# Patient Record
Sex: FEMALE | Race: WHITE | NOT HISPANIC OR LATINO | Employment: FULL TIME | URBAN - NONMETROPOLITAN AREA
[De-identification: names, ages, dates, MRNs, and addresses within clinical notes are randomized per-mention and may not be internally consistent; named-entity substitution may affect disease eponyms.]

---

## 2017-05-08 ENCOUNTER — HISTORY (OUTPATIENT)
Dept: EMERGENCY MEDICINE | Facility: OTHER | Age: 47
End: 2017-05-08

## 2017-05-15 ENCOUNTER — OFFICE VISIT - GICH (OUTPATIENT)
Dept: CHIROPRACTIC MEDICINE | Facility: OTHER | Age: 47
End: 2017-05-15

## 2017-05-15 DIAGNOSIS — M99.02 SEGMENTAL AND SOMATIC DYSFUNCTION OF THORACIC REGION: ICD-10-CM

## 2017-05-15 DIAGNOSIS — M47.896 OTHER SPONDYLOSIS, LUMBAR REGION: ICD-10-CM

## 2017-05-15 DIAGNOSIS — M62.838 OTHER MUSCLE SPASM: ICD-10-CM

## 2017-05-15 DIAGNOSIS — M99.03 SEGMENTAL AND SOMATIC DYSFUNCTION OF LUMBAR REGION: ICD-10-CM

## 2017-05-15 DIAGNOSIS — M47.818 SPONDYLOSIS WITHOUT MYELOPATHY OR RADICULOPATHY, SACRAL AND SACROCOCCYGEAL REGION: ICD-10-CM

## 2017-05-15 DIAGNOSIS — M54.50 LOW BACK PAIN: ICD-10-CM

## 2017-05-15 DIAGNOSIS — M99.05 SEGMENTAL AND SOMATIC DYSFUNCTION OF PELVIC REGION: ICD-10-CM

## 2017-05-15 DIAGNOSIS — M54.6 PAIN IN THORACIC SPINE: ICD-10-CM

## 2017-05-18 ENCOUNTER — OFFICE VISIT - GICH (OUTPATIENT)
Dept: CHIROPRACTIC MEDICINE | Facility: OTHER | Age: 47
End: 2017-05-18

## 2017-05-18 DIAGNOSIS — M47.818 SPONDYLOSIS WITHOUT MYELOPATHY OR RADICULOPATHY, SACRAL AND SACROCOCCYGEAL REGION: ICD-10-CM

## 2017-05-18 DIAGNOSIS — M62.838 OTHER MUSCLE SPASM: ICD-10-CM

## 2017-05-18 DIAGNOSIS — M54.6 PAIN IN THORACIC SPINE: ICD-10-CM

## 2017-05-18 DIAGNOSIS — M99.03 SEGMENTAL AND SOMATIC DYSFUNCTION OF LUMBAR REGION: ICD-10-CM

## 2017-05-18 DIAGNOSIS — M54.50 LOW BACK PAIN: ICD-10-CM

## 2017-05-18 DIAGNOSIS — M99.05 SEGMENTAL AND SOMATIC DYSFUNCTION OF PELVIC REGION: ICD-10-CM

## 2017-05-18 DIAGNOSIS — M47.896 OTHER SPONDYLOSIS, LUMBAR REGION: ICD-10-CM

## 2017-05-18 DIAGNOSIS — M99.02 SEGMENTAL AND SOMATIC DYSFUNCTION OF THORACIC REGION: ICD-10-CM

## 2017-09-18 ENCOUNTER — HISTORY (OUTPATIENT)
Dept: CHIROPRACTIC MEDICINE | Facility: OTHER | Age: 47
End: 2017-09-18

## 2017-09-18 ENCOUNTER — COMMUNICATION - GICH (OUTPATIENT)
Dept: CHIROPRACTIC MEDICINE | Facility: OTHER | Age: 47
End: 2017-09-18

## 2017-09-18 ENCOUNTER — OFFICE VISIT - GICH (OUTPATIENT)
Dept: CHIROPRACTIC MEDICINE | Facility: OTHER | Age: 47
End: 2017-09-18

## 2017-09-18 DIAGNOSIS — M54.6 PAIN IN THORACIC SPINE: ICD-10-CM

## 2017-09-18 DIAGNOSIS — M62.838 OTHER MUSCLE SPASM: ICD-10-CM

## 2017-09-18 DIAGNOSIS — M99.03 SEGMENTAL AND SOMATIC DYSFUNCTION OF LUMBAR REGION: ICD-10-CM

## 2017-09-18 DIAGNOSIS — M99.02 SEGMENTAL AND SOMATIC DYSFUNCTION OF THORACIC REGION: ICD-10-CM

## 2017-09-18 DIAGNOSIS — M54.16 RADICULOPATHY OF LUMBAR REGION: ICD-10-CM

## 2017-09-18 DIAGNOSIS — M47.818 SPONDYLOSIS WITHOUT MYELOPATHY OR RADICULOPATHY, SACRAL AND SACROCOCCYGEAL REGION: ICD-10-CM

## 2017-09-18 DIAGNOSIS — M99.05 SEGMENTAL AND SOMATIC DYSFUNCTION OF PELVIC REGION: ICD-10-CM

## 2017-09-18 DIAGNOSIS — M47.896 OTHER SPONDYLOSIS, LUMBAR REGION: ICD-10-CM

## 2017-11-28 ENCOUNTER — TRANSFERRED RECORDS (OUTPATIENT)
Dept: HEALTH INFORMATION MANAGEMENT | Facility: OTHER | Age: 47
End: 2017-11-28

## 2017-12-27 NOTE — PROGRESS NOTES
Patient Information     Patient Name MRN Sex Aruna Zuleta [id number] Female 1970      Progress Notes by Marylou Samson DC at 2017  4:30 PM     Author:  Marylou Samson DC Service:  (none) Author Type:  [facility]- Other provider     Filed:  2017  2:35 PM Encounter Date:  2017 Status:  Signed     :  Marylou Samson DC ([facility]- Other provider)            PATIENT:  Aruna Johnson is a 47 y.o. female referred from Mg Renee in ED    PROBLEM:   Date of Initial Visit for this Episode:  ;  Last visits 5/15/2017 and 17  Chief Complaint     Patient presents with       Lower Back Pain      R low back pain and severe spasm x2days   9/18/17 Visit #1    SUBJECTIVE: Aruna presents with acute right low back pain and muscle spasms radiating down the back of her right leg and around to her anterior pelvis.   Locates superior to R sacroiliac.  Started 2 days ago without trauma.    This is similar to episodes she experiences in May that improved with a couple visits of chiropractic treatment.  Rates current pain 6 out of 10 and at worst 10 out of 10.     Aggravating factors: Standing, bearing weight on right leg, transitioning in bed.  Alleviating factors: low back flexion of right knee into chest, Ice, heat, muscle relaxers, prescription hydrocodone and over-the-counter pain medication.     Yesterday did try to get out of bed and couldn't stand because of severity of back pain and muscle spasms and experienced urinary incontinence.  No other episodes of bowel or bladder incontinence, denies saddle anesthesia or denies lower extremity weakness secondary to pain.     Hasn't consulted or been seen by medical provider as she is uncertain how care will be paid for.     She works part-time as a  sitting on a barstool height stool.    Review of Systems:  The patient denies any fevers, chills, nausea, vomiting, diarrhea, constipation, dysuria, hematuria, or urinary hesitancy or  "incontinence.  No shortness of breath, chest pain, or rashes.      OBJECTIVE:  /76  Pulse 96  Temp 99  F (37.2  C) (Temporal)   General - the patient is alert, oriented, and in distress with movement.  Here with daughter pushing her in a wheelchair, her appearance is unkept and casual dress.    Transferring from sitting in the wheelchair to standing to get onto the examination table she falls onto the floor due to pain weightbearing and pulls her knee into her chest while lying on her right side.  She is able to turn onto her back with head supported with a pillow and after a few minutes stretching her lower body she is able to transfer to seated, taking a break, then prone onto the table with both her daughters and my assistance.    Neurologic:  -SLR, both legs to 80 degrees, normal 2/4 reflexes with negative Babinskis bilaterally.  Resisted strength +4/5 lower extremity.  Light touch sensory intact.      Musculoskeletal:    -Valsalva  No spinous tenderness.  -Kemps  -Passive hip extension prone:  +Focal low back pain  +SI compression  +Leg length inequality: Mild +[facility]; Restricted L heel to buttock +\"tension low back\"  Passive external and internal hip rotation while prone ROM essentially normal.    +Tenderness: T4-5, L4, bilateral sacroiliacs  +Muscle spasm: moderate parascapular, thoracic, R QL, lumbar paraspinals, glutes.    No reproduction of R leg pain on palpation of myofascial trigger points.  +Joint asymmetry and restriction:  T5 L,  INES, RLI    ASSESSMENT: DDX Myofascial strain, Facet syndrome    ICD-10-CM    1. Acute right lumbar radiculopathy M54.16 NM CHIRO SPINAL MANIP 3-4 REGIONS GICH ONLY   2. Segmental and somatic dysfunction of lumbar region M99.03 NM CHIRO SPINAL MANIP 3-4 REGIONS GICH ONLY   3. Osteoarthritis of facet joint of lumbar spine M47.896 NM CHIRO SPINAL MANIP 3-4 REGIONS GICH ONLY   4. Segmental and somatic dysfunction of pelvic region M99.05 NM CHIRO SPINAL MANIP 3-4 " REGIONS GICH ONLY   5. Osteoarthritis of sacroiliac region M47.818 MS CHIRO SPINAL MANIP 3-4 REGIONS GICH ONLY   6. Acute midline thoracic back pain M54.6 MS CHIRO SPINAL MANIP 3-4 REGIONS GICH ONLY   7. Segmental and somatic dysfunction of thoracic region M99.02 MS CHIRO SPINAL MANIP 3-4 REGIONS GICH ONLY   8. Spasm of muscle M62.838 MS CHIRO SPINAL MANIP 3-4 REGIONS GICH ONLY       PLAN    Care:  Risks and benefits were explained and patient agreed to proceed with today's proposed care.   Spinal Adjustments to noted levels using Diversified prone thoracic, LE LAD and Drop pelvic technique.  Patient Response: improved pain, able to transfer back to sitting with less assistance.    INSTRUCTIONS   Sit in chair with hips and knees at 90 degrees and low back support.  Apply alternating ice and heat to area for 15-20 min. to reduce pain/muscle soreness, spasm, and swelling/inflammation.  Repeat every 2 hours as needed.   Gentle Range Of Motion stretching as shown Therapeutic Exercises:  spinal twist, knees to chest supine.  Return 1-3 days.  Discussed Red Flags when to seek immediate medical attention.    9/18/17  Plan of Care:   5-8 visits of Chiropractic Care including Spinal Adjustments and/or physiotherapy and active rehabilitation, to include exercises in the office and/or at home to meet care plan goals.     Frequency:  2-3xweek for up to 2 weeks trial and re-assess improvement.    POC discussed and patient agreeable to plan of care.      9/18/17 Goals:  To be met by Re-eval in 2-4 weeks.     Patient:     Patient will report improved pain.   Patient will report improved walking, standing, personal care.

## 2017-12-28 NOTE — PATIENT INSTRUCTIONS
Patient Information     Patient Name MRN Sex Aruna Zuleta [id number] Female 1970      Patient Instructions by Marylou Samson DC at 2017  4:30 PM     Author:  Marylou Samson DC Service:  (none) Author Type:  [facility]- Other provider     Filed:  2017  2:05 PM Encounter Date:  2017 Status:  Signed     :  Marylou Samson DC ([facility]- Other provider)              INSTRUCTIONS   Apply alternating ice and heat to area for 15-20 min. to reduce pain/muscle soreness, spasm, and swelling/inflammation.  Repeat every 2 hours as needed.   Gentle Range Of Motion stretching as shown Therapeutic Exercises:  spinal twist, knees to chest supine.  Return 1-3 days.  Discussed Red Flags when to seek immediate medical attention.    17  Plan of Care:   5-8 visits of Chiropractic Care including Spinal Adjustments and/or physiotherapy and active rehabilitation, to include exercises in the office and/or at home to meet care plan goals.     Frequency:  2-3xweek for up to 2 weeks trial and re-assess improvement.    POC discussed and patient agreeable to plan of care.      17 Goals:  To be met by Re-eval in 2-4 weeks.     Patient:     Patient will report improved pain.   Patient will report improved walking, standing, personal care.

## 2017-12-28 NOTE — TELEPHONE ENCOUNTER
Patient Information     Patient Name MRN Aruna Polo [id number] Female 1970      Telephone Encounter by Marylou Samson DC at 2017  9:08 AM     Author:  Marylou Samson DC Service:  (none) Author Type:  [facility]- Other provider     Filed:  2017  9:12 AM Encounter Date:  2017 Status:  Signed     :  Marylou Samson DC ([facility]- Other provider)            Pt. Scheduled for 4:30pm today to return for chiropractic care.  She had two visits in May that were helpful before having to discontinue when insurance changed and she was uncertain how she would afford care.   She is taking prescribed 10 mg muscle relaxors and pain medication and wondering if she can take more muscle relaxors before her visit.  She gets muscle cramping when standing up.    She has been using heat, alternating with ice as well.  Pt. Advised to discuss medication use with PCP.  Marylou Samson DC ....................  2017   9:12 AM

## 2017-12-30 NOTE — NURSING NOTE
Patient Information     Patient Name MRN Aruna Polo [id number] Female 1970      Nursing Note by Candelaria Cedeño at 2017  4:30 PM     Author:  Candelaria Cedeño  Service:  (none) Author Type:  (none)     Filed:  2017  2:12 PM  Encounter Date:  2017 Status:  Addendum     :  Candelaria Cedeño        Related Notes: Original Note by Candelaria Cedeño filed at 2017  1:56 PM            Documenting vitals that were taken on 17 but not noted. Candelaria Cedeño ....................  2017   2:12 PM

## 2018-01-05 NOTE — PATIENT INSTRUCTIONS
Patient Information     Patient Name MRN Sex Aruna Zuleta [id number] Female 1970      Patient Instructions by Marylou Samson DC at 5/15/2017  3:45 PM     Author:  Marylou Samson DC Service:  (none) Author Type:  [facility]- Other provider     Filed:  2017 12:09 PM Encounter Date:  5/15/2017 Status:  Signed     :  Marylou Samson DC ([facility]- Other provider)              INSTRUCTIONS   Apply ice to area for 15-20 min. to reduce pain/muscle soreness, spasm, and swelling/inflammation.  Repeat every 2 hours as needed.   Gentle Range Of Motion stretching as shown  This is a good time to practice other healthy choices to reduce physical, chemical and emotional stressors that reduce your body's ability to recover.    Remain hopeful.  Return 2-4 days.    5/15/2017  Plan of Care:  Short term 5-8 visits of Chiropractic Care including Spinal Adjustments and/or physiotherapy and active rehabilitation, to include exercises in the office and/or at home to meet care plan goals.     Frequency:  2xweek for up to 4 weeks. Re-eval progress.    POC discussed and patient agreeable to plan of care.      5/15/2017 Goals:  To be met by Re-eval in 1 month approx 6/15/2017 .     Patient:     Patient will report improved pain.   Patient will report improved walking, standing, personal care.   Patient will demonstrate an improved ability to complete Activities of Daily Living  as shown by a reported reduced 10% score on back index.   Objective:     Patient will demonstrate improved ROM and motion on palpation testing.

## 2018-01-05 NOTE — PROGRESS NOTES
"Patient Information     Patient Name MRN Sex Aruna Zuleta [id number] Female 1970      Progress Notes by Marylou Samson DC at 5/15/2017  3:45 PM     Author:  Marylou Samson DC Service:  (none) Author Type:  [facility]- Other provider     Filed:  2017 12:09 PM Encounter Date:  5/15/2017 Status:  Signed     :  Marylou Samson DC ([facility]- Other provider)            PATIENT:  Aruan Johnson is a 46 y.o. female referred from Mg Renee in ED    PROBLEM:   Date of Initial Visit for this Episode:  5/15/2017  Chief Complaint     Patient presents with       Back Pain      Low back, R leg      2017 Visit #1-8    SUBJECTIVE / HPI:   Description, Duration and Location of Primary Problem: Bilateral low back pain, located lumbosacral radiating toward anterior hips on both sides, right more than left. Has radiated down R outer leg to ankle. Sharp pain, muscle spasm, a \"whole body contraction\".    Mechanism of Injury: 17 after sitting at work.   While crossing the parking lot to be seen in ED her legs gave out on her and she fell landing on her buttocks.  She states the pain radiates to her front quads as well right more than the left.  Denies any loss of bowel or bladder control no anal numbness or tingling.     Duration and Frequency of Pain: Frequent  Radiation of pain: yes, R leg to ankle  Pain rated 6/10 at it's worst and 4/10 at it's best.  Pain course: gradually improving, slow.  Worse with: driving, pushing R leg on gas or brake.  Improved by: Rx meds flexeril and icey hot patches.  Hasn't used ibuprofen and hydrocodone.  Previous injury:  Chronic low back pain since having children, now 21 & 23  Years old. Dull, aching, stiff after in position for a length of time.    Gets sharp, spasm pains last time occurred was 5 years ago.    Works at [employer], sitting on higher stools with foot rest and standing.  Will carry heavy coin.     5/15/2017  Neck index Not assessed today.   " Back index 38%    Functional limitations:  Driving, walking, standing (extension) and personal care.      Other Health Care Providers seen for this: ED  Work Habits: sits on a stool as a   Exercise habits:    Sleeping habits:  Sleeping well  Hobbies/Interests:  Past D.C. Care: during pregnancy         There is no problem list on file for this patient.      No past medical history on file.    No past surgical history on file.      Current Outpatient Prescriptions:      cyclobenzaprine (FLEXERIL) 10 mg tablet, Take 1 tablet by mouth 3 times daily., Disp: 30 tablet, Rfl: 0     HYDROcodone-acetaminophen, 5-325 mg, (NORCO) per tablet, Take 1 tablet by mouth every 4 hours if needed  for Pain Max acetaminophen dose: 4000 mg in 24 hrs., Disp: 30 tablet, Rfl: 0     ibuprofen (ADVIL; MOTRIN) 600 mg tablet, Take 1 tablet by mouth 4 times daily if needed for Pain. Maximum of 3200 mg in 24 hours., Disp: 60 tablet, Rfl: 0     MULTIVIT-MINERALS/FERROUS FUM (MULTI VITAMIN ORAL), Take 1 Tab by mouth once daily., Disp: , Rfl:      PARoxetine (PAXIL) 30 mg tablet, , Disp: , Rfl:   Medications have been reviewed by me and are current to the best of my knowledge and ability.      Social History     Social History        Marital status:       Spouse name: N/A     Number of children:  N/A     Years of education:  N/A     Occupational History      Not on file.     Social History Main Topics          Smoking status:   Current Every Day Smoker      Types:  Cigarettes      Smokeless tobacco:   Never Used      Alcohol use   Yes      Comment: 2xs month       Drug use:   No      Sexual activity:   Not on file      Other Topics  Concern     Not on file      Social History Narrative     None noted   Allergies:  Review of patient's allergies indicates no known allergies.    ROS:  A comprehensive review of systems was negative except for items noted in HPI/Subjective.      OBJECTIVE:    DIAGNOSTICS:  IMAGING:       XR SPINE LUMBAR  3 VIEWS (Final result)  Result time: 05/08/17 17:14:51      Final result by Artaic (05/08/17 17:14:51)       Narrative:       PROCEDURE:  XR PELVIS 1 VIEW, XR SPINE LUMBAR 3 VIEWS    HISTORY: BACK PAIN, acute.    COMPARISON:  None.    TECHNIQUE:  AP pelvis, 3 views lumbosacral spine.    FINDINGS:  AP pelvis demonstrates an intact pelvic ring. No acute fracture or dislocation is seen. There are mild degenerative changes of the pubic symphysis and left greater than right SI joints. The hip joint spaces are fairly well preserved although a small synovial herniation pit is questioned along the upper outer margin of the right femoral head; correlate for any evidence of impingement.    Images of the lumbosacral spine demonstrate preserved lumbar vertebral body heights. There is mild leftward lumbar curvature. There is low lumbar facet degeneration, most pronounced on the left at L5-S1.     IMPRESSION: No acute fracture.  Degenerative changes as above.    Electronically Signed By: Misael Rascon on 5/8/2017 5:10 PM                       XR PELVIS 1 VIEW (Final result) Result time: 05/08/17 17:14:51      Final result by Artaic (05/08/17 17:14:51)       Narrative:       PROCEDURE:  XR PELVIS 1 VIEW, XR SPINE LUMBAR 3 VIEWS    HISTORY: BACK PAIN, acute.    COMPARISON:  None.    TECHNIQUE:  AP pelvis, 3 views lumbosacral spine.    FINDINGS:  AP pelvis demonstrates an intact pelvic ring. No acute fracture or dislocation is seen. There are mild degenerative changes of the pubic symphysis and left greater than right SI joints. The hip joint spaces are fairly well preserved although a small synovial herniation pit is questioned along the upper outer margin of the right femoral head; correlate for any evidence of impingement.    Images of the lumbosacral spine demonstrate preserved lumbar vertebral body heights. There is mild leftward lumbar curvature. There is low lumbar facet degeneration, most  "pronounced on the left at L5-S1.     IMPRESSION: No acute fracture.  Degenerative changes as above.    Electronically Signed By: Misael Clineilly on 5/8/2017 5:10 PM               PHYSICAL EXAM:   /73  Pulse (!) 103  Temp 100.1  F (37.8  C) (Temporal)  Resp 16  Ht 1.556 m (5' 1.25\")  Wt 54.8 kg (120 lb 12.8 oz)  LMP 05/04/2017  BMI 22.64 kg/m2    General Appearance: Pleasant, alert, appropriate appearance for age. No acute distress  Neurologic Exam: Nonfocal; symmetric DTRs, normal light touch sensory, normal gross motor movement, tone, and coordination. Downgoing toes.  No tremor.     Musculoskeletal Exam:   Posture: Mild kyphosis, Low L iliac crest.    Gait: unremarkable.     Cervical:  Not assessed today.     Thoracic/Lumbar/Pelvic:  ROM:  Flexion full, extension +R sacroiliac pain, RLF restricted, LLF +R sacroiliac pain and restricted.  +Gillets: +INES, RLI   - Straight leg raise  + AROLDO: +R sacroiliac jt pain, restricted ROM.   +Leg length inequality: [facility]; Restricted R heel to buttock     +Tenderness: T4-5, L3, bilateral sacroiliacs  +Muscle spasm: moderate parascapular, thoracic, lumbosacral paraspinals.   +Joint asymmetry and restriction:  T5 ext, L4 R, INES, RLI    ASSESSMENT: Aruna Johnson is a 46 y.o. female lower lumbar and sacroiliac degeneration.        ICD-10-CM    1. Acute bilateral low back pain, with sciatica presence unspecified M54.5 MA CHIRO SPINAL MANIP 3-4 REGIONS GICH ONLY   2. Segmental and somatic dysfunction of pelvic region M99.05 MA CHIRO SPINAL MANIP 3-4 REGIONS GICH ONLY   3. Osteoarthritis of sacroiliac region M47.818 MA CHIRO SPINAL MANIP 3-4 REGIONS GICH ONLY   4. Segmental and somatic dysfunction of lumbar region M99.03 MA CHIRO SPINAL MANIP 3-4 REGIONS GICH ONLY   5. Osteoarthritis of facet joint of lumbar spine M47.896 MA CHIRO SPINAL MANIP 3-4 REGIONS GICH ONLY   6. Acute midline thoracic back pain M54.6 MA CHIRO SPINAL MANIP 3-4 REGIONS GICH ONLY   7. Segmental " and somatic dysfunction of thoracic region M99.02 CO CHIRO SPINAL MANIP 3-4 REGIONS GICH ONLY   8. Spasm of muscle M62.838 CO CHIRO SPINAL MANIP 3-4 REGIONS GICH ONLY       PLAN    Care:  Risks and benefits were explained and patient agreed to proceed with today's proposed care.   Spinal Adjustments to noted levels using Diversified prone thoracic, side posture and Drop pelvic technique.  5 min. 38664 Therapeutic Exercises spent with patient one on one to develop strength and endurance, range of motion and flexibility.  Patient able to demonstrate: spinal twist, knees to chest supine.  atient Response: improved pain    INSTRUCTIONS   Apply ice to area for 15-20 min. to reduce pain/muscle soreness, spasm, and swelling/inflammation.  Repeat every 2 hours as needed.   Gentle Range Of Motion stretching as shown  This is a good time to practice other healthy choices to reduce physical, chemical and emotional stressors that reduce your body's ability to recover.    Remain hopeful.  Return 2-4 days.    5/15/2017  Plan of Care:  Short term 5-8 visits of Chiropractic Care including Spinal Adjustments and/or physiotherapy and active rehabilitation, to include exercises in the office and/or at home to meet care plan goals.     Frequency:  2xweek for up to 4 weeks. Re-eval progress.    POC discussed and patient agreeable to plan of care.      5/15/2017 Goals:  To be met by Re-eval in 1 month approx 6/15/2017 .     Patient:     Patient will report improved pain.   Patient will report improved walking, standing, personal care.   Patient will demonstrate an improved ability to complete Activities of Daily Living  as shown by a reported reduced 10% score on back index.   Objective:     Patient will demonstrate improved ROM and motion on palpation testing.

## 2018-01-05 NOTE — PATIENT INSTRUCTIONS
Patient Information     Patient Name MRN Sex Aruna Zuleta [id number] Female 1970      Patient Instructions by Marylou Samson DC at 2017 10:30 AM     Author:  Marylou Samson DC Service:  (none) Author Type:  [facility]- Other provider     Filed:  2017 12:14 PM Encounter Date:  2017 Status:  Signed     :  Marylou Samson DC ([facility]- Other provider)              INSTRUCTIONS   Apply ice to area for 15-20 min. to reduce pain/muscle soreness, spasm, and swelling/inflammation.  Repeat every 2 hours as needed.   Gentle Range Of Motion stretching as shown Therapeutic Exercises:  spinal twist, knees to chest supine.  Next visit flexion exercises to strengthen.  Return 3-4 days.    5/15/2017  Plan of Care:  Short term 5-8 visits of Chiropractic Care including Spinal Adjustments and/or physiotherapy and active rehabilitation, to include exercises in the office and/or at home to meet care plan goals.     Frequency:  2xweek for up to 4 weeks. Re-eval progress.    POC discussed and patient agreeable to plan of care.      5/15/2017 Goals:  To be met by Re-eval in 1 month approx 6/15/2017 .     Patient:     Patient will report improved pain.   Patient will report improved walking, standing, personal care.   Patient will demonstrate an improved ability to complete Activities of Daily Living  as shown by a reported reduced 10% score on back index.   Objective:     Patient will demonstrate improved ROM and motion on palpation testing.

## 2018-01-05 NOTE — PROGRESS NOTES
"Patient Information     Patient Name MRN Sex Aruna Zuleta [id number] Female 1970      Progress Notes by Marylou Samson DC at 2017 10:30 AM     Author:  Marylou Samson DC Service:  (none) Author Type:  [facility]- Other provider     Filed:  2017 12:15 PM Encounter Date:  2017 Status:  Signed     :  Marylou Samson DC ([facility]- Other provider)            PATIENT:  Aruna Johnson is a 46 y.o. female referred from [facility] in ED    PROBLEM:   Date of Initial Visit for this Episode:  5/15/2017  Chief Complaint     Patient presents with       Chiropractic Care      f/u    2017 Visit #2/5-8    SUBJECTIVE: Pain better, can move easier.  Rates it 2/10 current and at workst 4/10 after sitting 30 min.  Stretching helpful.    Hasn't taken any medication in past 3 days.      OBJECTIVE:   +Leg length inequality: Mild +Derefield R; Restricted L heel to buttock +\"tension low back\".  +Tenderness: T4-5, L3, bilateral sacroiliacs  +Muscle spasm: moderate parascapular, thoracic, lumbosacral paraspinals.   +Joint asymmetry and restriction:  T3 L, T5 R, L3 L, INES, RLI    ASSESSMENT:     ICD-10-CM    1. Acute bilateral low back pain, with sciatica presence unspecified M54.5 VT CHIRO SPINAL MANIP 3-4 REGIONS GICH ONLY   2. Segmental and somatic dysfunction of pelvic region M99.05 VT CHIRO SPINAL MANIP 3-4 [facility] ONLY   3. Osteoarthritis of sacroiliac region M47.818 VT CHIRO SPINAL MANIP 3-4 REGIONS GICH ONLY   4. Segmental and somatic dysfunction of lumbar region M99.03 VT CHIRO SPINAL MANIP 3-4 REGIONS GICH ONLY   5. Osteoarthritis of facet joint of lumbar spine M47.896 VT CHIRO SPINAL MANIP 3-4 REGIONS GICH ONLY   6. Acute midline thoracic back pain M54.6 VT CHIRO SPINAL MANIP 3-4 REGIONS GICH ONLY   7. Segmental and somatic dysfunction of thoracic region M99.02 VT CHIRO SPINAL MANIP 3-4 REGIONS GICH ONLY   8. Spasm of muscle M62.838 VT CHIRO SPINAL MANIP 3-4 REGIONS GICH ONLY "       PLAN    Care:  Risks and benefits were explained and patient agreed to proceed with today's proposed care.   Spinal Adjustments to noted levels using Diversified prone thoracic, LE LAD and Drop pelvic technique.    atient Response: improved pain    INSTRUCTIONS   Apply ice to area for 15-20 min. to reduce pain/muscle soreness, spasm, and swelling/inflammation.  Repeat every 2 hours as needed.   Gentle Range Of Motion stretching as shown Therapeutic Exercises:  spinal twist, knees to chest supine.  Next visit flexion exercises to strengthen.  Return 3-4 days.    5/15/2017  Plan of Care:  Short term 5-8 visits of Chiropractic Care including Spinal Adjustments and/or physiotherapy and active rehabilitation, to include exercises in the office and/or at home to meet care plan goals.     Frequency:  2xweek for up to 4 weeks. Re-eval progress.    POC discussed and patient agreeable to plan of care.      5/15/2017 Goals:  To be met by Re-eval in 1 month approx 6/15/2017 .     Patient:     Patient will report improved pain.   Patient will report improved walking, standing, personal care.   Patient will demonstrate an improved ability to complete Activities of Daily Living  as shown by a reported reduced 10% score on back index.   Objective:     Patient will demonstrate improved ROM and motion on palpation testing.

## 2018-01-10 ENCOUNTER — AMBULATORY - GICH (OUTPATIENT)
Dept: RADIOLOGY | Facility: OTHER | Age: 48
End: 2018-01-10

## 2018-01-10 DIAGNOSIS — Z12.31 ENCOUNTER FOR SCREENING MAMMOGRAM FOR MALIGNANT NEOPLASM OF BREAST: ICD-10-CM

## 2018-01-27 VITALS
DIASTOLIC BLOOD PRESSURE: 73 MMHG | HEIGHT: 61 IN | RESPIRATION RATE: 16 BRPM | SYSTOLIC BLOOD PRESSURE: 111 MMHG | BODY MASS INDEX: 22.81 KG/M2 | TEMPERATURE: 100.1 F | HEART RATE: 103 BPM | WEIGHT: 120.8 LBS

## 2018-01-27 VITALS — TEMPERATURE: 99 F | HEART RATE: 96 BPM | DIASTOLIC BLOOD PRESSURE: 76 MMHG | SYSTOLIC BLOOD PRESSURE: 116 MMHG

## 2018-02-14 ENCOUNTER — DOCUMENTATION ONLY (OUTPATIENT)
Dept: FAMILY MEDICINE | Facility: OTHER | Age: 48
End: 2018-02-14

## 2018-02-14 RX ORDER — HYDROCODONE BITARTRATE AND ACETAMINOPHEN 5; 325 MG/1; MG/1
1 TABLET ORAL EVERY 4 HOURS PRN
COMMUNITY
Start: 2017-05-08 | End: 2020-01-31

## 2018-02-14 RX ORDER — CYCLOBENZAPRINE HCL 10 MG
10 TABLET ORAL 3 TIMES DAILY
COMMUNITY
Start: 2017-05-08 | End: 2020-01-31

## 2018-02-14 RX ORDER — IBUPROFEN 600 MG/1
600 TABLET, FILM COATED ORAL 4 TIMES DAILY PRN
COMMUNITY
Start: 2017-05-08 | End: 2020-01-31

## 2018-02-14 RX ORDER — PAROXETINE 30 MG/1
TABLET, FILM COATED ORAL
COMMUNITY
Start: 2016-04-16 | End: 2020-01-31

## 2018-02-26 ENCOUNTER — TRANSFERRED RECORDS (OUTPATIENT)
Dept: MRI IMAGING | Facility: OTHER | Age: 48
End: 2018-02-26

## 2018-03-09 ENCOUNTER — HOSPITAL ENCOUNTER (OUTPATIENT)
Dept: MRI IMAGING | Facility: OTHER | Age: 48
Discharge: HOME | End: 2018-03-09
Payer: COMMERCIAL

## 2018-03-09 DIAGNOSIS — M79.661 RIGHT CALF PAIN: ICD-10-CM

## 2018-03-09 DIAGNOSIS — G89.29 CHRONIC RADICULAR PAIN OF LOWER EXTREMITY: ICD-10-CM

## 2018-03-09 DIAGNOSIS — M54.10 CHRONIC RADICULAR PAIN OF LOWER EXTREMITY: ICD-10-CM

## 2018-03-09 PROCEDURE — 72148 MRI LUMBAR SPINE W/O DYE: CPT

## 2018-08-24 ENCOUNTER — MEDICAL CORRESPONDENCE (OUTPATIENT)
Dept: HEALTH INFORMATION MANAGEMENT | Facility: OTHER | Age: 48
End: 2018-08-24

## 2018-09-19 DIAGNOSIS — M51.369 DDD (DEGENERATIVE DISC DISEASE), LUMBAR: Primary | ICD-10-CM

## 2018-10-24 ENCOUNTER — HOSPITAL ENCOUNTER (OUTPATIENT)
Dept: PHYSICAL THERAPY | Facility: OTHER | Age: 48
Setting detail: THERAPIES SERIES
End: 2018-10-24
Attending: PHYSICAL MEDICINE & REHABILITATION
Payer: COMMERCIAL

## 2018-10-24 DIAGNOSIS — M51.369 DDD (DEGENERATIVE DISC DISEASE), LUMBAR: ICD-10-CM

## 2018-10-24 PROCEDURE — 97161 PT EVAL LOW COMPLEX 20 MIN: CPT | Mod: GP | Performed by: PHYSICAL THERAPIST

## 2018-10-24 PROCEDURE — 97110 THERAPEUTIC EXERCISES: CPT | Mod: GP | Performed by: PHYSICAL THERAPIST

## 2018-10-24 PROCEDURE — 40000185 ZZHC STATISTIC PT OUTPT VISIT: Performed by: PHYSICAL THERAPIST

## 2018-10-24 NOTE — PROGRESS NOTES
10/24/18 1005   General Information   Type of Visit Initial OP Ortho PT Evaluation   Start of Care Date 10/24/18   Referring Physician Emmy   Patient/Family Goals Statement reduce back pain   Orders Evaluate and Treat   Date of Order 09/19/18   Insurance Type ([employer])   Medical Diagnosis DDD (degenerative disc disease), lumbar M51.36    Surgical/Medical history reviewed Yes   Precautions/Limitations no known precautions/limitations   Weight-Bearing Status - LLE full weight-bearing   Weight-Bearing Status - RLE full weight-bearing   General Information Comments refer to pt's chart for any additional information    Body Part(s)   Body Part(s) Lumbar Spine/SI   Presentation and Etiology   Pertinent history of current problem (include personal factors and/or comorbidities that impact the POC) Pt has had on/off back pain for the past year but she has noticed that her back has been more flared up as of recently. She's had some chiro sessions in the past. She does some stretching on her own. She had an injection 3-4 months ago and that helped, she isn't sure if the injection is wearing off and that is also contributing to her current back pain.   Impairments A. Pain   Functional Limitations perform activities of daily living;perform required work activities;perform desired leisure / sports activities   Symptom Location low back   How/Where did it occur While lifting   Onset date of current episode/exacerbation 10/24/17   Chronicity Chronic   Pain rating (0-10 point scale) Best (/10);Worst (/10)   Best (/10) 3   Worst (/10) 10  (hasn't had 10/10 pain in quite a while)   Pain quality A. Sharp;C. Aching   Frequency of pain/symptoms C. With activity   Pain/symptoms exacerbated by J. ADL;K. Home tasks;A. Sitting;B. Walking;G. Certain positions  (dishes, cleaning her house)   Pain/symptoms eased by H. Cold;I. OTC medication(s);C. Rest;E. Changing positions   Progression of symptoms since onset: Unchanged   Current /  Previous Interventions   Diagnostic Tests: MRI   MRI Results Results   MRI results see chart for details   Prior Level of Function   Prior Level of Function-Mobility Ind   Prior Level of Function-ADLs Ind   Current Level of Function   Current Community Support Family/friend caregiver   Patient role/employment history E. Unemployed   [facility]   Current equipment-Gait/Locomotion None   Current equipment-ADL None   Fall Risk Screen   Fall screen completed by PT   Have you fallen 2 or more times in the past year? No   Have you fallen and had an injury in the past year? No   Is patient a fall risk? No   Abuse Risk Screen   QUESTION TO PATIENT:  Has a member of your family or a partner(now or in the past) intimidated, hurt, manipulated, or controlled you in any way? no   QUESTION TO PATIENT: Do you feel safe going back to the place where you are living? yes   OBSERVATION: Is there reason to believe there has been maltreatment of a vulnerable adult (ie. Physical/Sexual/Emotional abuse, self neglect, lack of adequate food, shelter, medical care, or financial exploitation)? no   Functional Scales   Functional Scales Other   Other Scales  PSFS   Lumbar Spine/SI Objective Findings   Observation pt very pleasant and in no acute distress   Gait/Locomotion unremarkable   Repeated Extension-Standing ROM done in supine, did not decrease or increase pain   Pelvic Screen good body awareness, good technique with pelvic clocks   Hip Screen normal ROM; 4/5 MMT all planes bilaterally   Hamstring Flexibility normal but pt does report tightness with hamstring stretching   Neurological Testing Comments no discrepancies with LE dermatome or myotome testing   Planned Therapy Interventions   Planned Therapy Interventions joint mobilization;manual therapy;motor coordination training;neuromuscular re-education;strengthening   Planned Modality Interventions   Planned Modality Interventions Cryotherapy;Electrical  stimulation;Ultrasound   Clinical Impression   Criteria for Skilled Therapeutic Interventions Met yes, treatment indicated   PT Diagnosis LBP, SI pain   Influenced by the following impairments decreased LE strength   Functional limitations due to impairments ADLs, ie standing and washing dishes   Clinical Presentation Stable/Uncomplicated   Clinical Decision Making (Complexity) Low complexity   Therapy Frequency 2 times/Week   Predicted Duration of Therapy Intervention (days/wks) 8 weeks   Risk & Benefits of therapy have been explained Yes   Patient, Family & other staff in agreement with plan of care Yes   ORTHO GOALS   PT Ortho Eval Goals 1;2;3   Ortho Goal 1   Goal Identifier Pain   Goal Description Pt to report a max low back pain level of 1/10 for improved ADLs   Target Date 12/19/18   Ortho Goal 2   Goal Identifier Sleep   Goal Description Pt to report nights of improved sleep quality for improved wellness   Target Date 12/19/18   Ortho Goal 3   Goal Identifier ADLs   Goal Description Pt to be able to stand and do dishes without increasing LBP   Target Date 12/19/18   Total Evaluation Time   Total Evaluation Time 20

## 2018-11-01 ENCOUNTER — HOSPITAL ENCOUNTER (OUTPATIENT)
Dept: PHYSICAL THERAPY | Facility: OTHER | Age: 48
Setting detail: THERAPIES SERIES
End: 2018-11-01
Attending: PHYSICAL MEDICINE & REHABILITATION
Payer: COMMERCIAL

## 2018-11-01 PROCEDURE — 40000185 ZZHC STATISTIC PT OUTPT VISIT: Performed by: PHYSICAL THERAPIST

## 2018-11-01 PROCEDURE — 97110 THERAPEUTIC EXERCISES: CPT | Mod: GP | Performed by: PHYSICAL THERAPIST

## 2018-11-07 ENCOUNTER — HOSPITAL ENCOUNTER (OUTPATIENT)
Dept: PHYSICAL THERAPY | Facility: OTHER | Age: 48
Setting detail: THERAPIES SERIES
End: 2018-11-07
Attending: PHYSICAL MEDICINE & REHABILITATION
Payer: COMMERCIAL

## 2018-11-07 PROCEDURE — 97110 THERAPEUTIC EXERCISES: CPT | Mod: GP | Performed by: PHYSICAL THERAPIST

## 2018-11-07 PROCEDURE — 97035 APP MDLTY 1+ULTRASOUND EA 15: CPT | Mod: GP | Performed by: PHYSICAL THERAPIST

## 2018-11-07 PROCEDURE — 40000185 ZZHC STATISTIC PT OUTPT VISIT: Performed by: PHYSICAL THERAPIST

## 2018-11-07 PROCEDURE — 97140 MANUAL THERAPY 1/> REGIONS: CPT | Mod: GP | Performed by: PHYSICAL THERAPIST

## 2018-11-14 ENCOUNTER — HOSPITAL ENCOUNTER (OUTPATIENT)
Dept: PHYSICAL THERAPY | Facility: OTHER | Age: 48
Setting detail: THERAPIES SERIES
End: 2018-11-14
Attending: PHYSICAL MEDICINE & REHABILITATION
Payer: COMMERCIAL

## 2018-11-14 PROCEDURE — 97035 APP MDLTY 1+ULTRASOUND EA 15: CPT | Mod: GP | Performed by: PHYSICAL THERAPIST

## 2018-11-14 PROCEDURE — 97110 THERAPEUTIC EXERCISES: CPT | Mod: GP | Performed by: PHYSICAL THERAPIST

## 2018-11-14 PROCEDURE — 97140 MANUAL THERAPY 1/> REGIONS: CPT | Mod: GP | Performed by: PHYSICAL THERAPIST

## 2018-11-14 PROCEDURE — 40000185 ZZHC STATISTIC PT OUTPT VISIT: Performed by: PHYSICAL THERAPIST

## 2018-11-14 NOTE — ADDENDUM NOTE
Encounter addended by: Marty Deshpande, PT on: 11/14/2018  1:04 PM<BR>     Actions taken: Flowsheet accepted

## 2018-11-20 ENCOUNTER — HOSPITAL ENCOUNTER (OUTPATIENT)
Dept: PHYSICAL THERAPY | Facility: OTHER | Age: 48
Setting detail: THERAPIES SERIES
End: 2018-11-20
Attending: PHYSICAL MEDICINE & REHABILITATION
Payer: COMMERCIAL

## 2018-11-20 PROCEDURE — 97140 MANUAL THERAPY 1/> REGIONS: CPT | Mod: GP | Performed by: PHYSICAL THERAPIST

## 2018-11-20 PROCEDURE — 97110 THERAPEUTIC EXERCISES: CPT | Mod: GP | Performed by: PHYSICAL THERAPIST

## 2018-11-20 PROCEDURE — 97035 APP MDLTY 1+ULTRASOUND EA 15: CPT | Mod: GP | Performed by: PHYSICAL THERAPIST

## 2018-11-20 PROCEDURE — 40000185 ZZHC STATISTIC PT OUTPT VISIT: Performed by: PHYSICAL THERAPIST

## 2018-11-28 ENCOUNTER — HOSPITAL ENCOUNTER (OUTPATIENT)
Dept: PHYSICAL THERAPY | Facility: OTHER | Age: 48
Setting detail: THERAPIES SERIES
End: 2018-11-28
Attending: PHYSICAL MEDICINE & REHABILITATION
Payer: COMMERCIAL

## 2018-11-28 PROCEDURE — 40000185 ZZHC STATISTIC PT OUTPT VISIT: Performed by: PHYSICAL THERAPIST

## 2018-11-28 PROCEDURE — 97110 THERAPEUTIC EXERCISES: CPT | Mod: GP | Performed by: PHYSICAL THERAPIST

## 2018-12-05 ENCOUNTER — HOSPITAL ENCOUNTER (OUTPATIENT)
Dept: PHYSICAL THERAPY | Facility: OTHER | Age: 48
Setting detail: THERAPIES SERIES
End: 2018-12-05
Attending: PHYSICAL MEDICINE & REHABILITATION
Payer: COMMERCIAL

## 2018-12-05 PROCEDURE — 97110 THERAPEUTIC EXERCISES: CPT | Mod: GP | Performed by: PHYSICAL THERAPIST

## 2018-12-05 PROCEDURE — 40000185 ZZHC STATISTIC PT OUTPT VISIT: Performed by: PHYSICAL THERAPIST

## 2018-12-12 ENCOUNTER — HOSPITAL ENCOUNTER (OUTPATIENT)
Dept: PHYSICAL THERAPY | Facility: OTHER | Age: 48
Setting detail: THERAPIES SERIES
End: 2018-12-12
Attending: PHYSICAL MEDICINE & REHABILITATION
Payer: COMMERCIAL

## 2018-12-12 PROCEDURE — 97110 THERAPEUTIC EXERCISES: CPT | Mod: GP | Performed by: PHYSICAL THERAPIST

## 2019-01-25 NOTE — ADDENDUM NOTE
Encounter addended by: Marty Deshpande, PT on: 1/25/2019 1:10 PM   Actions taken: Sign clinical note, Episode resolved

## 2019-01-25 NOTE — PROGRESS NOTES
Outpatient Physical Therapy Discharge Note     Patient: Aruna Johnson  : 1970    Beginning/End Dates:  10/24/18 to 18    Referring Provider: Dr Booth    Therapy Diagnosis: DDD (degenerative disc disease), lumbar M51.36      Plan:  Discharge from therapy.    Discharge:   Pt has not returned to physical therapy after a trial of self mgmt techniques indicating that she is doing well with HEP. She will also utilize [facility] at [facility]. Please refer to pt's chart for most recent information on objective measurements, goals or any additional information. This is an unplanned discontinue.     Reason for Discharge: No further expectation of progress.  Patient chooses to discontinue therapy.    Discharge Plan: Patient to continue home program.

## 2019-07-26 ENCOUNTER — THERAPY VISIT (OUTPATIENT)
Dept: CHIROPRACTIC MEDICINE | Facility: OTHER | Age: 49
End: 2019-07-26
Attending: CHIROPRACTOR
Payer: COMMERCIAL

## 2019-07-26 DIAGNOSIS — M54.6 PAIN IN THORACIC SPINE: ICD-10-CM

## 2019-07-26 DIAGNOSIS — M99.02 SEGMENTAL AND SOMATIC DYSFUNCTION OF THORACIC REGION: ICD-10-CM

## 2019-07-26 DIAGNOSIS — M99.01 SEGMENTAL AND SOMATIC DYSFUNCTION OF CERVICAL REGION: Primary | ICD-10-CM

## 2019-07-26 DIAGNOSIS — M51.379 DEGENERATION OF LUMBAR OR LUMBOSACRAL INTERVERTEBRAL DISC: ICD-10-CM

## 2019-07-26 DIAGNOSIS — M99.04 SEGMENTAL AND SOMATIC DYSFUNCTION OF SACRAL REGION: ICD-10-CM

## 2019-07-26 DIAGNOSIS — M54.2 CERVICALGIA: ICD-10-CM

## 2019-07-26 PROCEDURE — 98941 CHIROPRACT MANJ 3-4 REGIONS: CPT | Mod: AT | Performed by: CHIROPRACTOR

## 2019-07-26 PROCEDURE — 99202 OFFICE O/P NEW SF 15 MIN: CPT | Mod: 25 | Performed by: CHIROPRACTOR

## 2019-07-26 NOTE — PROGRESS NOTES
PATIENT:  Aruna Johnson is a 49 year old female presenting for Neck pain    PROBLEM:   Date of Initial Visit for this Episode:  7/26/2019     Visit #1    SUBJECTIVE / HPI: Neck pain began approximately 2 days ago.  Patient attributes onset of neck pain to sleeping funny.  Patient denied any other traumatic events or other overuse injuries preceding flareup of neck pain symptoms.  As symptoms have not fully resolved patient presents to our office for further evaluation and treatment  Description and onset:  Duration and Frequency of Pain: 2 days and frequent  Radiation of pain: no  Pain rated at it's worst: 4/10  Pain rated currently:  4/10  Pain course: Gradually improving  Worse with:  twisting  Improved by:  No Treatment tried to date  Additional Features: unremarkable  Other Health Care Providers seen for this: Dr. Duran ELIZALDE  Previous treatment: Chiropractic  Previous injury:Unremarkable      Other Secondary/Associated Problems  Description, Duration and Location of Seondary Problem: Low back    Duration and Frequency of Pain: On going and constant  Radiation of pain: no  Pain rated at it's worst: 1/10  Pain rated currently:  1/10  Pain course: comes and goes  Worse with:  Nothing specific   Improved by:  adjustments  Additional Features: Patient does have history of degenerative changes  Other Health Care Providers seen for this: Dr. Garzon   Previous treatment: Chiropractic   Previous injury:Patient does have history of degenerative changes    See flowsheets in chart for details.  7/26/2019    Neck Disability Index (  Nirmal H. and Svitlana C. 1991. All rights reserved.; used with permission) 7/26/2019   SECTION 1 - PAIN INTENSITY 1   SECTION 2 - PERSONAL CARE 0   SECTION 3 - LIFTING 0   SECTION 4 - READING 1   SECTION 5 - HEADACHES 5   SECTION 6 - CONCENTRATION 0   SECTION 7 - WORK 0   SECTION 8 - DRIVING 2   SECTION 9 - SLEEPING 0   SECTION 10 - RECREATION 1   Count 10   Sum 10   Raw Score: /50 10   Neck  Disability Index Score: (%) 20     Functional limitations:  Headaches and twisting movements of the neck.      Past D.C. Care: yes, helpful       Health History as reported by the patient: Good      PAST MEDICAL HISTORY:  History reviewed. No pertinent past medical history.    PAST SURGICAL HISTORY:  No past surgical history on file.    ALLERGIES:  No Known Allergies    CURRENT MEDICATIONS:  Current Outpatient Medications   Medication Sig Dispense Refill     cyclobenzaprine (FLEXERIL) 10 MG tablet Take 10 mg by mouth 3 times daily       HYDROcodone-acetaminophen (NORCO) 5-325 MG per tablet Take 1 tablet by mouth every 4 hours as needed for pain Max acetaminophen dose: 4000mg in 24hrs.       ibuprofen (ADVIL/MOTRIN) 600 MG tablet Take 600 mg by mouth 4 times daily as needed for pain Max of 3200mg in 24 hours.       Multiple Vitamins-Minerals (MULTIVITAMIN & MINERAL PO) Take 1 tablet by mouth daily       PARoxetine (PAXIL) 30 MG tablet          SOCIAL HISTORY:  Marital Status: .  Children: Not on File  Occupation: Works for [employer].  Alcohol use:yes.  Tobacco use: Smoker: yes.      FAMILY HISTORY:  Family History   Problem Relation Age of Onset     Genetic Disorder Other         Genetic,None noted     Breast Cancer No family hx of         Cancer-breast       There is no problem list on file for this patient.        ROS:  The patient denies any fevers, chills, nausea, vomiting, diarrhea, constipation,dysuria, hematuria, or urinary hesitancy or incontinence.  No shortness of breath, chest pain, or rashes.    OBJECTIVE:    DIAGNOSTICS:Study Result     MR LUMBAR SPINE W/O CONTRAST     HISTORY: 47 years Female n; Right calf pain; Chronic radicular pain of  lower extremity; Chronic radicular pain of lower extremity     COMPARISONS:None     TECHNIQUE: Sagittal T1, sagittal T2, sagittal STIR, axial proton  density, axial T2 imaging of the lumbar spine was performed.     FINDINGS: Alignment of the lumbar spine is  normal   there is  discogenic bone marrow edema at the L4-L5 level. No bone marrow signal  abnormality is otherwise present.Signal intensity and caliber of the  lower thoracic spinal cord is normal. The conus medullaris is at the  T12-L1 level.     L5-S1: There is loss of disc signal and loss of disc height. The  central spinal canal is patent. The foramen are patent. There is  bilateral facet osteoarthritis.     L4-L5: There is loss of disc height and signal. There is a posterior  disc bulge with small to moderate-sized right paracentral disc  herniation. This narrows the right lateral recess and imparts mass  effect upon the right L5 nerve root. Both neuroforamen are patent.  There is bilateral facet osteoarthritis.     L3-L4: There is normal disc space height and signal. The central  spinal canal and neural foramen are patent.     L2-L3: There is normal disc space height and signal. The central  spinal canal and neural foramen are patent.     L1-L2: There is normal disc space height and signal. The central  spinal canal and neural foramen are patent.                                                                      IMPRESSION: There is a small to moderate size right paracentral disc  herniation at L4-L5. This narrows right lateral recess and imparts  mass effect upon the right L5 nerve root.     Mild degenerative disc disease at the L5-S1 level.     Bilateral facet osteoarthritis at L5-S1 and L4-L5.     RADHA CA MD   Study Result     PROCEDURE:  XR PELVIS 1 VIEW, XR SPINE LUMBAR 3 VIEWS     HISTORY: BACK PAIN, acute.     COMPARISON:  None.     TECHNIQUE:  AP pelvis, 3 views lumbosacral spine.     FINDINGS:  AP pelvis demonstrates an intact pelvic ring. No acute fracture or dislocation is seen. There are mild degenerative changes of the pubic symphysis and left greater than right SI joints. The hip joint spaces are fairly well preserved although a   small synovial herniation pit is questioned along the  upper outer margin of the right femoral head; correlate for any evidence of impingement.     Images of the lumbosacral spine demonstrate preserved lumbar vertebral body heights. There is mild leftward lumbar curvature. There is low lumbar facet degeneration, most pronounced on the left at L5-S1.     IMPRESSION:  No acute fracture.  Degenerative changes as above.     Electronically Signed By: Misael Rascon on 5/8/2017 5:10 PM     Study Result     PROCEDURE:  XR PELVIS 1 VIEW, XR SPINE LUMBAR 3 VIEWS     HISTORY: BACK PAIN, acute.     COMPARISON:  None.     TECHNIQUE:  AP pelvis, 3 views lumbosacral spine.     FINDINGS:  AP pelvis demonstrates an intact pelvic ring. No acute fracture or dislocation is seen. There are mild degenerative changes of the pubic symphysis and left greater than right SI joints. The hip joint spaces are fairly well preserved although a   small synovial herniation pit is questioned along the upper outer margin of the right femoral head; correlate for any evidence of impingement.     Images of the lumbosacral spine demonstrate preserved lumbar vertebral body heights. There is mild leftward lumbar curvature. There is low lumbar facet degeneration, most pronounced on the left at L5-S1.     IMPRESSION:  No acute fracture.  Degenerative changes as above.     Electronically Signed By: Misael Rascon on 5/8/2017 5:10 PM           PHYSICAL EXAM:     GENERAL APPEARANCE: healthy, alert, mild distress and cooperative   GAIT: NORMAL      MUSCULOSKELETAL:   Posture: Anterior head carriage, rounded shoulders L>>R.  Low left iliac crest, Level shoulders, Gait:  unremarkable.     Cervical  ROM:   smooth/halting arc of motion   50/50 flexion mild pain   40/45 extension    35/45 RLF  35/45 LLF    70/85 RR         75/85 LR       -Maximal Foraminal Compression: +focal mild neck pain.    Shoulder Depression: negative  -Distraction improves      Thoracic and Lumbar  ROM:  60/60 flexion 55/60 extension    35/45 RLF     40/45 LLF   45/45 RR      45/45 LR    -Kemps: negative  - Straight leg raise  Other:  +Elys on left, +Nachlas on left    +Tenderness: Along the suboccipital ridge bilaterally, C2 and C5 midline, left PSIS  +Muscle spasm: suboccipitals, trapezius, levator scapula bilaterally, left quadratus lumborum.  Taut and tender fibers are noted of the intrascapular T-spine paraspinals bilaterally.  +Joint asymmetry and restriction: C1 left lateral flexion right rotation, C2 extension right lateral flexion, T2 extension, TX extension, S1 extension and left lateral flexion.    ASSESSMENT: Aruna Johnson is a 49 year old female presented with primary complaints of neck pain with secondary complaints of low back pain.  Patient has been under chiropractic care in the past with beneficial results and I do anticipate that be the case this current episode of care.  No other contraindications are present precluding patient from receiving care on today's visit.  I do anticipate short course of interventional care at which time patient will likely placed on flareup care.  This is of course barring acute exacerbation of symptoms at this time.     1. Segmental and somatic dysfunction of cervical region    2. Cervicalgia    3. Segmental and somatic dysfunction of thoracic region    4. Pain in thoracic spine    5. Segmental and somatic dysfunction of sacral region    6. Degeneration of lumbar or lumbosacral intervertebral disc        PLAN    Evaluation and Management:  29101 Low to moderate level exam 20 min    Procedures:  Modalities:  None performed this visit    CMT:  [id number] Chiropractic manipulative treatment 3-4 regions performed   Cervical: Diversified, C1 , C2, Supine  Thoracic: Diversified, T2, T6, Prone  Pelvis: Diversified, Sacrum , Side posture    Therapeutic procedures:  None    Response to Treatment  Reduction in symptoms as reported by patient    Prognosis: Excellent    7/26/2019 Plan of Care: 2-4 visits of Chiropractic Care  including Spinal Adjustments and/or physiotherapy and active rehabilitation, to include exercises in the office and/or at home to meet care plan goals.     Frequency: 1xweek for up to 4 weeks. A reevaluation would be clinically appropriate in 4 visits, to determine progress and further course of care.    POC discussed and patient agreeable to plan of care.      7/26/2019 Goals:      Patient will report improved pain of the cervical spine by 25%.   Patient will report decreased headaches.   Patient will demonstrate an improved ability to complete Activities of Daily Living  as shown by a reported 10% reduced score on neck  index.    Patient will demonstrate improved ROM of the cervical spine.        INSTRUCTIONS   ice 20 minutes every other hour as needed and heat 15 minutes every other hour as needed    Follow-up:  Return to care in 1 week.        Disclaimer: This note consists of symbols derived from keyboarding, dictation and/or voice recognition software. As a result, there may be errors in the script that have gone undetected. Please consider this when interpreting information found in this chart.

## 2020-01-30 NOTE — PROGRESS NOTES
"Nursing Notes:   Codi Nicolas LPN  1/31/2020  9:51 AM  Sign at exiting of workspace  Chief Complaint   Patient presents with     Establish Care     Recheck Medication     Initial /70   Pulse 72   Temp 97.1  F (36.2  C) (Tympanic)   Resp 16   Ht 1.524 m (5')   Wt 56.7 kg (125 lb)   LMP 01/13/2020 (Approximate)   Breastfeeding No   BMI 24.41 kg/m    Estimated body mass index is 24.41 kg/m  as calculated from the following:    Height as of this encounter: 1.524 m (5').    Weight as of this encounter: 56.7 kg (125 lb).  Medication Reconciliation: complete  Codi Nicolas LPN      SUBJECTIVE:   Aruna Johnson is a 49 year old female who presents to clinic today for the following health issues:    HPI  Aruna is here to establish care.  She was previously seeing [facility].    She has a history of:  1. Lumbar back pain - DDD @ L5-S1, facet arthropathy L4-L5 and L5-S1 and small-mod disc herniation at L4-L5 with mass effect on R L5 nerve root  = as seen on Last MRI 3/9/2018.  Pain is in low back - worse with prolonged standing, bending/lifting and activities - like golfing.  She was worried it would be exacerbated when she started her new job at [employer] as a ; but she actually believes it is helping her back to stay active.  She has had epidural injection, PT, chiropractor, and medications in the past.  Previously following with pain management, PM&R.  Does not have any difficulty with her back at this time.  2. Refill of Paxil.  Has been on it for ~18 years; and was initially started for panic attacks/anxiety.  Sleep is okay.  Has tried to wean off of it at times; and mood worsens, but she also experiences the \"head zapping\" sensation that many get when trying to wean off this medication.  For the time being she is happy with her current dose.    PHQ-9 SCORE 1/31/2020   PHQ-9 Total Score 5     No flowsheet data found.      Pap: 4/17/2015, negative co-testing; but endometrial cells seen (normal " in pre-menopausal women).  DUE soon.  Still getting periods.  Every ~24-28 days.  Lasting 3 days (day #2 is the heaviest).    Mammogram: 4/6/2016.  DUE.  Will order today to have completed on same day as upcoming physical.  Colonoscopy: none.  Due @ 50 (this summer).  Dexa: none/NA.    Tdap: 4/17/2015, Flu: declines, Shingrix @ 50; PNA @ 65/66.    Patient Active Problem List    Diagnosis Date Noted     Lumbar back pain with radiculopathy affecting right lower extremity 03/02/2018     Priority: Medium     No past medical history on file.   Past Surgical History:   Procedure Laterality Date     EXTRACTION(S) DENTAL  ~2000    + nausea from anesthesia     Family History   Problem Relation Age of Onset     Breast Cancer Other      Cancer Mother         sarcoma - outside of stomach     No Known Problems Father      Breast Cancer Maternal Grandmother      Social History     Tobacco Use     Smoking status: Current Every Day Smoker     Types: Cigarettes     Smokeless tobacco: Never Used     Tobacco comment: 5 cigarettes    Substance Use Topics     Alcohol use: Yes     Comment: Alcoholic Drinks/day: couple drinks at night     Social History     Social History Narrative     Not on file     Current Outpatient Medications   Medication Sig Dispense Refill     PARoxetine (PAXIL) 30 MG tablet Take 1 tablet (30 mg) by mouth daily 90 tablet 4     Multiple Vitamins-Minerals (MULTIVITAMIN & MINERAL PO) Take 1 tablet by mouth daily       No Known Allergies    Review of Systems     OBJECTIVE:     /70   Pulse 72   Temp 97.1  F (36.2  C) (Tympanic)   Resp 16   Ht 1.524 m (5')   Wt 56.7 kg (125 lb)   LMP 01/13/2020 (Approximate)   Breastfeeding No   BMI 24.41 kg/m    Body mass index is 24.41 kg/m .  Physical Exam  Vitals signs and nursing note reviewed.   Constitutional:       Appearance: Normal appearance. She is normal weight.   HENT:      Head: Normocephalic and atraumatic.      Right Ear: Tympanic membrane and ear canal  normal.      Left Ear: Tympanic membrane and ear canal normal.      Nose: Nose normal.      Mouth/Throat:      Mouth: Mucous membranes are moist.   Eyes:      General:         Right eye: No discharge.         Left eye: No discharge.      Extraocular Movements: Extraocular movements intact.      Pupils: Pupils are equal, round, and reactive to light.   Neck:      Musculoskeletal: Normal range of motion and neck supple.      Vascular: No carotid bruit.   Cardiovascular:      Rate and Rhythm: Normal rate and regular rhythm.      Pulses: Normal pulses.      Heart sounds: Normal heart sounds.   Pulmonary:      Effort: Pulmonary effort is normal.      Breath sounds: Normal breath sounds.   Abdominal:      General: Abdomen is flat. There is no distension.   Lymphadenopathy:      Cervical: No cervical adenopathy.   Skin:     General: Skin is warm.      Capillary Refill: Capillary refill takes less than 2 seconds.      Findings: No erythema or rash.   Neurological:      General: No focal deficit present.      Mental Status: She is alert.      Motor: No weakness.      Gait: Gait normal.   Psychiatric:         Mood and Affect: Mood normal.         Judgment: Judgment normal.       Diagnostic Test Results:  Results for orders placed or performed in visit on 01/31/20   TSH     Status: None   Result Value Ref Range    Thyrotropin 2.11 0.34 - 5.60 IU/mL   Basic Metabolic Panel     Status: None   Result Value Ref Range    Sodium 138 134 - 144 mmol/L    Potassium 4.1 3.5 - 5.1 mmol/L    Chloride 103 98 - 107 mmol/L    Carbon Dioxide 28 21 - 31 mmol/L    Anion Gap 7 3 - 14 mmol/L    Glucose 91 70 - 105 mg/dL    Urea Nitrogen 14 7 - 25 mg/dL    Creatinine 0.83 0.60 - 1.20 mg/dL    GFR Estimate 73 >60 mL/min/[1.73_m2]    GFR Estimate If Black 88 >60 mL/min/[1.73_m2]    Calcium 9.4 8.6 - 10.3 mg/dL   Lipid Panel     Status: Abnormal   Result Value Ref Range    Cholesterol 224 (H) <200 mg/dL    Triglycerides 73 <150 mg/dL    HDL  Cholesterol 55 23 - 92 mg/dL    LDL Cholesterol Calculated 154 (H) <100 mg/dL    Non HDL Cholesterol 169 (H) <130 mg/dL       ASSESSMENT/PLAN:     1. Encounter to establish care with new doctor    2. Lumbar back pain with radiculopathy affecting right lower extremity  No concerns today; encouraged to continue with regular physical activity and stretching daily.    3. Dysthymia  Chronic, stable.    4. MARY (generalized anxiety disorder)  Chronic, stable on current Paxil 30mg daily.  If worsening, can consider making use of employee benefits and seeing a counselor/therapist as a trial.  - PARoxetine (PAXIL) 30 MG tablet; Take 1 tablet (30 mg) by mouth daily  Dispense: 90 tablet; Refill: 4    5. Visit for screening mammogram  Ordered to be completed with an upcoming physical.  - MA Screen Bilateral w/Raman; Future    6. Screening for thyroid disorder  - TSH; Future  - TSH    7. Screening for diabetes mellitus  - Basic Metabolic Panel; Future  - Basic Metabolic Panel    8. Lipid screening  - Lipid Panel; Future  - Lipid Panel    Fasting today; so labs will be checked.  Has had some elevated cholesterol in the past  Discussed new recommendation for heart disease prevention.    Tegan Koch DO  [facility]    ADDENDUM:  Cholesterol returned elevated, especially  after 1 year of lifestyle changes, improved eating habits.  She was recommended to start a statin and it will be sent to the pharmacy.  The 10-year ASCVD risk score (Adeel RODRIGUEZ Jr., et al., 2013) is: 3%    Values used to calculate the score:      Age: 49 years      Sex: Female      Is Non- : No      Diabetic: No      Tobacco smoker: Yes      Systolic Blood Pressure: 112 mmHg      Is BP treated: No      HDL Cholesterol: 55 mg/dL      Total Cholesterol: 224 mg/dL    Tegan Koch DO on 2/2/2020 at 5:03 AM

## 2020-01-31 ENCOUNTER — OFFICE VISIT (OUTPATIENT)
Dept: FAMILY MEDICINE | Facility: OTHER | Age: 50
End: 2020-01-31
Attending: FAMILY MEDICINE
Payer: COMMERCIAL

## 2020-01-31 VITALS
WEIGHT: 125 LBS | TEMPERATURE: 97.1 F | RESPIRATION RATE: 16 BRPM | HEIGHT: 60 IN | DIASTOLIC BLOOD PRESSURE: 70 MMHG | SYSTOLIC BLOOD PRESSURE: 112 MMHG | BODY MASS INDEX: 24.54 KG/M2 | HEART RATE: 72 BPM

## 2020-01-31 DIAGNOSIS — Z76.89 ENCOUNTER TO ESTABLISH CARE WITH NEW DOCTOR: Primary | ICD-10-CM

## 2020-01-31 DIAGNOSIS — Z13.29 SCREENING FOR THYROID DISORDER: ICD-10-CM

## 2020-01-31 DIAGNOSIS — M54.16 LUMBAR BACK PAIN WITH RADICULOPATHY AFFECTING RIGHT LOWER EXTREMITY: ICD-10-CM

## 2020-01-31 DIAGNOSIS — E78.00 ELEVATED LDL CHOLESTEROL LEVEL: ICD-10-CM

## 2020-01-31 DIAGNOSIS — Z12.31 VISIT FOR SCREENING MAMMOGRAM: ICD-10-CM

## 2020-01-31 DIAGNOSIS — Z13.220 LIPID SCREENING: ICD-10-CM

## 2020-01-31 DIAGNOSIS — F34.1 DYSTHYMIA: ICD-10-CM

## 2020-01-31 DIAGNOSIS — F41.1 GAD (GENERALIZED ANXIETY DISORDER): ICD-10-CM

## 2020-01-31 DIAGNOSIS — Z13.1 SCREENING FOR DIABETES MELLITUS: ICD-10-CM

## 2020-01-31 LAB
ANION GAP SERPL CALCULATED.3IONS-SCNC: 7 MMOL/L (ref 3–14)
BUN SERPL-MCNC: 14 MG/DL (ref 7–25)
CALCIUM SERPL-MCNC: 9.4 MG/DL (ref 8.6–10.3)
CHLORIDE SERPL-SCNC: 103 MMOL/L (ref 98–107)
CHOLEST SERPL-MCNC: 224 MG/DL
CO2 SERPL-SCNC: 28 MMOL/L (ref 21–31)
CREAT SERPL-MCNC: 0.83 MG/DL (ref 0.6–1.2)
GFR SERPL CREATININE-BSD FRML MDRD: 73 ML/MIN/{1.73_M2}
GLUCOSE SERPL-MCNC: 91 MG/DL (ref 70–105)
HDLC SERPL-MCNC: 55 MG/DL (ref 23–92)
LDLC SERPL CALC-MCNC: 154 MG/DL
NONHDLC SERPL-MCNC: 169 MG/DL
POTASSIUM SERPL-SCNC: 4.1 MMOL/L (ref 3.5–5.1)
SODIUM SERPL-SCNC: 138 MMOL/L (ref 134–144)
TRIGL SERPL-MCNC: 73 MG/DL
TSH SERPL DL<=0.05 MIU/L-ACNC: 2.11 IU/ML (ref 0.34–5.6)

## 2020-01-31 PROCEDURE — 80061 LIPID PANEL: CPT | Mod: ZL | Performed by: FAMILY MEDICINE

## 2020-01-31 PROCEDURE — 80048 BASIC METABOLIC PNL TOTAL CA: CPT | Mod: ZL | Performed by: FAMILY MEDICINE

## 2020-01-31 PROCEDURE — 99214 OFFICE O/P EST MOD 30 MIN: CPT | Performed by: FAMILY MEDICINE

## 2020-01-31 PROCEDURE — 36415 COLL VENOUS BLD VENIPUNCTURE: CPT | Mod: ZL | Performed by: FAMILY MEDICINE

## 2020-01-31 PROCEDURE — 84443 ASSAY THYROID STIM HORMONE: CPT | Mod: ZL | Performed by: FAMILY MEDICINE

## 2020-01-31 RX ORDER — PAROXETINE 30 MG/1
30 TABLET, FILM COATED ORAL DAILY
Qty: 90 TABLET | Refills: 4 | Status: SHIPPED | OUTPATIENT
Start: 2020-01-31 | End: 2021-02-16

## 2020-01-31 ASSESSMENT — PAIN SCALES - GENERAL: PAINLEVEL: NO PAIN (0)

## 2020-01-31 ASSESSMENT — MIFFLIN-ST. JEOR: SCORE: 1113.5

## 2020-01-31 ASSESSMENT — PATIENT HEALTH QUESTIONNAIRE - PHQ9: SUM OF ALL RESPONSES TO PHQ QUESTIONS 1-9: 5

## 2020-01-31 NOTE — NURSING NOTE
Chief Complaint   Patient presents with     [facility] Care     Recheck Medication       Initial /70   Pulse 72   Temp 97.1  F (36.2  C) (Tympanic)   Resp 16   Ht 1.524 m (5')   Wt 56.7 kg (125 lb)   LMP 01/13/2020 (Approximate)   Breastfeeding No   BMI 24.41 kg/m   Estimated body mass index is 24.41 kg/m  as calculated from the following:    Height as of this encounter: 1.524 m (5').    Weight as of this encounter: 56.7 kg (125 lb).  Medication Reconciliation: complete    Codi Nicolas LPN

## 2020-02-02 PROBLEM — F41.1 GAD (GENERALIZED ANXIETY DISORDER): Status: ACTIVE | Noted: 2020-02-02

## 2020-02-02 PROBLEM — E78.00 ELEVATED LDL CHOLESTEROL LEVEL: Status: ACTIVE | Noted: 2020-02-02

## 2020-02-02 RX ORDER — ATORVASTATIN CALCIUM 10 MG/1
10 TABLET, FILM COATED ORAL DAILY
Qty: 90 TABLET | Refills: 1 | Status: SHIPPED | OUTPATIENT
Start: 2020-02-02 | End: 2020-10-22 | Stop reason: SINTOL

## 2020-03-09 ENCOUNTER — THERAPY VISIT (OUTPATIENT)
Dept: CHIROPRACTIC MEDICINE | Facility: OTHER | Age: 50
End: 2020-03-09
Attending: CHIROPRACTOR
Payer: COMMERCIAL

## 2020-03-09 DIAGNOSIS — M54.50 ACUTE RIGHT-SIDED LOW BACK PAIN WITHOUT SCIATICA: ICD-10-CM

## 2020-03-09 DIAGNOSIS — M51.379 DEGENERATION OF LUMBAR OR LUMBOSACRAL INTERVERTEBRAL DISC: Primary | ICD-10-CM

## 2020-03-09 DIAGNOSIS — M99.03 SEGMENTAL AND SOMATIC DYSFUNCTION OF LUMBAR REGION: ICD-10-CM

## 2020-03-09 PROCEDURE — 99213 OFFICE O/P EST LOW 20 MIN: CPT | Mod: 25 | Performed by: CHIROPRACTOR

## 2020-03-09 PROCEDURE — 98940 CHIROPRACT MANJ 1-2 REGIONS: CPT | Mod: AT | Performed by: CHIROPRACTOR

## 2020-03-09 NOTE — PROGRESS NOTES
PATIENT:  Aruna Johnson is a 49 year old female presenting for right sided lower back pain    PROBLEM:   Date of Initial Visit for this Episode:  3/9/2020     Visit #1    SUBJECTIVE / HPI: Patient presents with primary complaints of right-sided lower back pain.  Patient reports that approximately 3 weeks ago she was lifting an object which she believes she was doing incorrectly.  Patient believes this as upon lifting said object she began experiencing pain into lower abdomen/groin as well as radicular complaints into right leg into the anterior superior ankle on the right side.  Patient reports attempting to manage symptoms on her own.  Patient states that radiating pain into the right leg and abdomen have decreased however back pain continues to be problematic to the point of affecting her sleep which is why she is presenting to our office at this time.  Description and onset:  Duration and Frequency of Pain: 3 weeks and frequent  Radiation of pain: right leg initially, none currently  Pain rated at it's worst: 4/10  Pain rated currently:  4/10  Pain course: Gradually getting worse  Worse with:  sitting and standing  Improved by:  Ice, Other medications: and stretching   Additional Features: unremarkable  Other Health Care Providers seen for this: patient has been under our care in the past. Dr. Duran JACOBSEN  Previous treatment: chirorpactic  Previous injury:patient has history of lumbar degenerative disc disease        See flowsheets in chart for details.  3/9/2020    Oswestry (NGHIA) Questionnaire    OSWESTRY DISABILITY INDEX 3/9/2020   Count 9   Sum 21   Oswestry Score (%) 46.67   Some recent data might be hidden      Functional limitations:  Sitting/standing more than 10 minutes, lifting, sleeping        Past D.C. Care: yes, helpful       Health History as reported by the patient: Excellent      PAST MEDICAL HISTORY:  No past medical history on file.    PAST SURGICAL HISTORY:  Past Surgical History:   Procedure  Laterality Date     EXTRACTION(S) DENTAL  ~2000    + nausea from anesthesia       ALLERGIES:  No Known Allergies    CURRENT MEDICATIONS:  Current Outpatient Medications   Medication Sig Dispense Refill     atorvastatin (LIPITOR) 10 MG tablet Take 1 tablet (10 mg) by mouth daily 90 tablet 1     Multiple Vitamins-Minerals (MULTIVITAMIN & MINERAL PO) Take 1 tablet by mouth daily       PARoxetine (PAXIL) 30 MG tablet Take 1 tablet (30 mg) by mouth daily 90 tablet 4       SOCIAL HISTORY:  Marital Status: .  Children: Not on File  Occupation: Works for [employer].  Alcohol use:yes.  Tobacco use: Smoker: yes.      FAMILY HISTORY:  Family History   Problem Relation Age of Onset     Breast Cancer Other      Cancer Mother         sarcoma - outside of stomach     No Known Problems Father      Breast Cancer Maternal Grandmother        Patient Active Problem List   Diagnosis     Lumbar back pain with radiculopathy affecting right lower extremity     Elevated LDL cholesterol level     MARY (generalized anxiety disorder)         ROS:  The patient denies any fevers, chills, nausea, vomiting, diarrhea, constipation,dysuria, hematuria, or urinary hesitancy or incontinence.  No shortness of breath, chest pain, or rashes.    OBJECTIVE:    DIAGNOSTICS:   DIAGNOSTICS:Study Result      MR LUMBAR SPINE W/O CONTRAST     HISTORY: 47 years Female n; Right calf pain; Chronic radicular pain of  lower extremity; Chronic radicular pain of lower extremity     COMPARISONS:None     TECHNIQUE: Sagittal T1, sagittal T2, sagittal STIR, axial proton  density, axial T2 imaging of the lumbar spine was performed.     FINDINGS: Alignment of the lumbar spine is normal   there is  discogenic bone marrow edema at the L4-L5 level. No bone marrow signal  abnormality is otherwise present.Signal intensity and caliber of the  lower thoracic spinal cord is normal. The conus medullaris is at the  T12-L1 level.     L5-S1: There is loss of disc signal and loss  of disc height. The  central spinal canal is patent. The foramen are patent. There is  bilateral facet osteoarthritis.     L4-L5: There is loss of disc height and signal. There is a posterior  disc bulge with small to moderate-sized right paracentral disc  herniation. This narrows the right lateral recess and imparts mass  effect upon the right L5 nerve root. Both neuroforamen are patent.  There is bilateral facet osteoarthritis.     L3-L4: There is normal disc space height and signal. The central  spinal canal and neural foramen are patent.     L2-L3: There is normal disc space height and signal. The central  spinal canal and neural foramen are patent.     L1-L2: There is normal disc space height and signal. The central  spinal canal and neural foramen are patent.                                                                      IMPRESSION: There is a small to moderate size right paracentral disc  herniation at L4-L5. This narrows right lateral recess and imparts  mass effect upon the right L5 nerve root.     Mild degenerative disc disease at the L5-S1 level.     Bilateral facet osteoarthritis at L5-S1 and L4-L5.     RADHA CA MD   Study Result      PROCEDURE:  XR PELVIS 1 VIEW, XR SPINE LUMBAR 3 VIEWS     HISTORY: BACK PAIN, acute.     COMPARISON:  None.     TECHNIQUE:  AP pelvis, 3 views lumbosacral spine.     FINDINGS:  AP pelvis demonstrates an intact pelvic ring. No acute fracture or dislocation is seen. There are mild degenerative changes of the pubic symphysis and left greater than right SI joints. The hip joint spaces are fairly well preserved although a   small synovial herniation pit is questioned along the upper outer margin of the right femoral head; correlate for any evidence of impingement.     Images of the lumbosacral spine demonstrate preserved lumbar vertebral body heights. There is mild leftward lumbar curvature. There is low lumbar facet degeneration, most pronounced on the left at  L5-S1.     IMPRESSION:  No acute fracture.  Degenerative changes as above.     Electronically Signed By: Misael Rascon on 5/8/2017 5:10 PM      Study Result      PROCEDURE:  XR PELVIS 1 VIEW, XR SPINE LUMBAR 3 VIEWS     HISTORY: BACK PAIN, acute.     COMPARISON:  None.     TECHNIQUE:  AP pelvis, 3 views lumbosacral spine.     FINDINGS:  AP pelvis demonstrates an intact pelvic ring. No acute fracture or dislocation is seen. There are mild degenerative changes of the pubic symphysis and left greater than right SI joints. The hip joint spaces are fairly well preserved although a   small synovial herniation pit is questioned along the upper outer margin of the right femoral head; correlate for any evidence of impingement.     Images of the lumbosacral spine demonstrate preserved lumbar vertebral body heights. There is mild leftward lumbar curvature. There is low lumbar facet degeneration, most pronounced on the left at L5-S1.     IMPRESSION:  No acute fracture.  Degenerative changes as above.     Electronically Signed By: Misael Rascon on 5/8/2017 5:10 PM         PHYSICAL EXAM:     GENERAL APPEARANCE: healthy, alert, active and mild distress   GAIT: NORMAL      MUSCULOSKELETAL:   Posture: Generally unremarkable.  Mild elevation right iliac crest compared to left side  Gait:  unremarkable.         Thoracic and Lumbar wound actively, measured approximately  ROM:  60/60 flexion 55/60 extension    35/45 RLF    45/45 LLF   45/45 RR      45/45 LR    +Kemps: right lumbar spine  + Straight leg raise right lower back, no radicular symptoms  + AROLDO: negative sacroiliac jt pain, restricted ROM on right   Other:  +right Ely's    +Tenderness: Present over the right side of L5  +Muscle spasm: No spasms currently present.  Taut and tender fibers are noted of the right quadratus lumborum, lumbar paraspinals bilaterally right side predominant, right gluteus medius  +Joint asymmetry and restriction: L5 with extension and right lateral  flexion.    ASSESSMENT: Aruna Johnson is a 49 year old female presenting with primary complaints of right-sided low back pain without sciatica currently.  Patient has been under care with general neck and back pain complaints in the past and does respond favorably to chiropractic adjustments and I do believe that to be the case with this current episode.  Patient was quite concerned today about how to manage arthritis primarily of the spine.  Patient was provided with recommendations of proper lifestyle changes which included but were not limited to exercise, weight management, and nutrition.  Patient reports being compliant with the majority of these recommendations.     1. Degeneration of lumbar or lumbosacral intervertebral disc    2. Segmental and somatic dysfunction of lumbar region    3. Acute right-sided low back pain without sciatica        PLAN    Evaluation and Management:  46135 Low to Moderate exam established patient 10 min    Procedures:  Modalities:  None performed this visit    CMT:  [id number] Chiropractic manipulative treatment 1-2 regions performed   Lumbar: Diversified, L5, Side posture    Therapeutic procedures:  Cat/camel stretch reviewed.    Response to Treatment  Reduction in symptoms as reported by patient    Prognosis: Excellent    3/9/2020 Plan of Care:  4-6 visits of Chiropractic Care including Spinal Adjustments and/or physiotherapy and active rehabilitation, to include exercises in the office and/or at home to meet care plan goals.     Frequency: 1-2xweek for up to 4 weeks. A reevaluation would be clinically appropriate in 6 visits, to determine progress and further course of care.    POC discussed and patient agreeable to plan of care.      3/9/2020 Goals:      Patient will report improved low back pain by 90%.   Patient will report able to sit/stand for more than 1 hr.   Patient will report able to sleep without painful limits.   Patient will demonstrate an improved ability to complete  Activities of Daily Living  as shown by a reported 10-30% reduced score on  back index.    Patient will demonstrate improved lumbar ROM.        INSTRUCTIONS   ice 20 minutes every other hour as needed and stretch as instructed at visit    Follow-up:  Return to care in 1 week.        Disclaimer: This note consists of symbols derived from keyboarding, dictation and/or voice recognition software. As a result, there may be errors in the script that have gone undetected. Please consider this when interpreting information found in this chart.

## 2020-03-11 ENCOUNTER — HEALTH MAINTENANCE LETTER (OUTPATIENT)
Age: 50
End: 2020-03-11

## 2020-05-05 ENCOUNTER — HOSPITAL ENCOUNTER (OUTPATIENT)
Dept: MAMMOGRAPHY | Facility: OTHER | Age: 50
Discharge: HOME | End: 2020-05-05
Attending: FAMILY MEDICINE | Admitting: FAMILY MEDICINE
Payer: COMMERCIAL

## 2020-05-05 DIAGNOSIS — Z12.31 VISIT FOR SCREENING MAMMOGRAM: ICD-10-CM

## 2020-05-05 PROCEDURE — 77063 BREAST TOMOSYNTHESIS BI: CPT

## 2020-05-07 ENCOUNTER — HOSPITAL ENCOUNTER (OUTPATIENT)
Dept: ULTRASOUND IMAGING | Facility: OTHER | Age: 50
End: 2020-05-07
Attending: FAMILY MEDICINE
Payer: COMMERCIAL

## 2020-05-07 ENCOUNTER — HOSPITAL ENCOUNTER (OUTPATIENT)
Dept: MAMMOGRAPHY | Facility: OTHER | Age: 50
End: 2020-05-07
Attending: FAMILY MEDICINE
Payer: COMMERCIAL

## 2020-05-07 DIAGNOSIS — R92.8 ABNORMAL FINDING ON BREAST IMAGING: ICD-10-CM

## 2020-05-07 PROCEDURE — 77065 DX MAMMO INCL CAD UNI: CPT

## 2020-05-07 PROCEDURE — 76642 ULTRASOUND BREAST LIMITED: CPT | Mod: RT

## 2020-06-16 ENCOUNTER — OFFICE VISIT (OUTPATIENT)
Dept: FAMILY MEDICINE | Facility: OTHER | Age: 50
End: 2020-06-16
Attending: FAMILY MEDICINE
Payer: COMMERCIAL

## 2020-06-16 VITALS
WEIGHT: 131.25 LBS | BODY MASS INDEX: 24.78 KG/M2 | SYSTOLIC BLOOD PRESSURE: 126 MMHG | HEIGHT: 61 IN | DIASTOLIC BLOOD PRESSURE: 80 MMHG | RESPIRATION RATE: 16 BRPM | TEMPERATURE: 97.9 F | HEART RATE: 72 BPM

## 2020-06-16 DIAGNOSIS — Z12.4 CERVICAL CANCER SCREENING: Primary | ICD-10-CM

## 2020-06-16 DIAGNOSIS — E78.00 ELEVATED LDL CHOLESTEROL LEVEL: ICD-10-CM

## 2020-06-16 LAB
CHOLEST SERPL-MCNC: 229 MG/DL
HDLC SERPL-MCNC: 61 MG/DL (ref 23–92)
LDLC SERPL CALC-MCNC: 142 MG/DL
NONHDLC SERPL-MCNC: 168 MG/DL
TRIGL SERPL-MCNC: 129 MG/DL

## 2020-06-16 PROCEDURE — 99213 OFFICE O/P EST LOW 20 MIN: CPT | Performed by: FAMILY MEDICINE

## 2020-06-16 PROCEDURE — 80061 LIPID PANEL: CPT | Mod: ZL | Performed by: FAMILY MEDICINE

## 2020-06-16 PROCEDURE — 36415 COLL VENOUS BLD VENIPUNCTURE: CPT | Mod: ZL | Performed by: FAMILY MEDICINE

## 2020-06-16 PROCEDURE — 88142 CYTOPATH C/V THIN LAYER: CPT | Performed by: FAMILY MEDICINE

## 2020-06-16 PROCEDURE — 87624 HPV HI-RISK TYP POOLED RSLT: CPT | Mod: ZL | Performed by: FAMILY MEDICINE

## 2020-06-16 PROCEDURE — G0123 SCREEN CERV/VAG THIN LAYER: HCPCS | Performed by: FAMILY MEDICINE

## 2020-06-16 ASSESSMENT — ANXIETY QUESTIONNAIRES
GAD7 TOTAL SCORE: 8
2. NOT BEING ABLE TO STOP OR CONTROL WORRYING: SEVERAL DAYS
1. FEELING NERVOUS, ANXIOUS, OR ON EDGE: SEVERAL DAYS
6. BECOMING EASILY ANNOYED OR IRRITABLE: MORE THAN HALF THE DAYS
IF YOU CHECKED OFF ANY PROBLEMS ON THIS QUESTIONNAIRE, HOW DIFFICULT HAVE THESE PROBLEMS MADE IT FOR YOU TO DO YOUR WORK, TAKE CARE OF THINGS AT HOME, OR GET ALONG WITH OTHER PEOPLE: SOMEWHAT DIFFICULT
3. WORRYING TOO MUCH ABOUT DIFFERENT THINGS: SEVERAL DAYS
5. BEING SO RESTLESS THAT IT IS HARD TO SIT STILL: SEVERAL DAYS
7. FEELING AFRAID AS IF SOMETHING AWFUL MIGHT HAPPEN: SEVERAL DAYS

## 2020-06-16 ASSESSMENT — PATIENT HEALTH QUESTIONNAIRE - PHQ9
SUM OF ALL RESPONSES TO PHQ QUESTIONS 1-9: 5
5. POOR APPETITE OR OVEREATING: SEVERAL DAYS

## 2020-06-16 ASSESSMENT — MIFFLIN-ST. JEOR: SCORE: 1157.73

## 2020-06-16 ASSESSMENT — PAIN SCALES - GENERAL: PAINLEVEL: NO PAIN (0)

## 2020-06-16 NOTE — NURSING NOTE
"Chief Complaint   Patient presents with     Gyn Exam       Initial /80   Pulse 72   Temp 97.9  F (36.6  C) (Tympanic)   Resp 16   Ht 1.549 m (5' 1\")   Wt 59.5 kg (131 lb 4 oz)   LMP 05/28/2020 (Approximate)   BMI 24.80 kg/m   Estimated body mass index is 24.8 kg/m  as calculated from the following:    Height as of this encounter: 1.549 m (5' 1\").    Weight as of this encounter: 59.5 kg (131 lb 4 oz).  Medication Reconciliation: complete    Codi Nicolas LPN  "

## 2020-06-16 NOTE — PROGRESS NOTES
"Nursing Notes:   Codi Nicolas LPN  6/16/2020 10:47 AM  Signed  Chief Complaint   Patient presents with     Gyn Exam     Initial /80   Pulse 72   Temp 97.9  F (36.6  C) (Tympanic)   Resp 16   Ht 1.549 m (5' 1\")   Wt 59.5 kg (131 lb 4 oz)   LMP 05/28/2020 (Approximate)   BMI 24.80 kg/m   Estimated body mass index is 24.8 kg/m  as calculated from the following:    Height as of this encounter: 1.549 m (5' 1\").    Weight as of this encounter: 59.5 kg (131 lb 4 oz).  Medication Reconciliation: complete  Codi Nicolas LPN    SUBJECTIVE:   Aruna Johnson is a 49 year old female who presents to clinic today for the following health issues:    HPI  Aruna is here for pap smear.  Last one was ~2010, normal.  No prior abnormal results.  No family history of  cancer.  LMP was 5/28/2020, coming every ~22-26d; seemingly closer together than they used to be.  Not particularly heavy.    She is also due to have cholesterol levels checked.  LDL was above goal. Since last visit, she has made significant dietary changes to low-fat/minimal processed foods.  Was hopeful her levels improved significantly and she can avoid medication.  But does have atorvastatin at home.  Hasn't started yet.    Anxiety is slightly up; but feels like it is related to the Covid-19 pandemic/unknown of how long she has to wear a mask at work, etc.    PHQ 1/31/2020 6/16/2020   PHQ-9 Total Score 5 5   Q9: Thoughts of better off dead/self-harm past 2 weeks Not at all Not at all     MARY-7 SCORE 6/16/2020   Total Score 8       Patient Active Problem List    Diagnosis Date Noted     Elevated LDL cholesterol level 02/02/2020     Priority: Medium     MARY (generalized anxiety disorder) 02/02/2020     Priority: Medium     Lumbar back pain with radiculopathy affecting right lower extremity 03/02/2018     Priority: Medium     No past medical history on file.   Past Surgical History:   Procedure Laterality Date     EXTRACTION(S) DENTAL  ~2000    + nausea " "from anesthesia     Family History   Problem Relation Age of Onset     Breast Cancer Other      Cancer Mother         sarcoma - outside of stomach     No Known Problems Father      Breast Cancer Maternal Grandmother      Social History     Tobacco Use     Smoking status: Current Every Day Smoker     Types: Cigarettes     Smokeless tobacco: Never Used     Tobacco comment: 5 cigarettes    Substance Use Topics     Alcohol use: Yes     Comment: Alcoholic Drinks/day: couple drinks at night     Social History     Social History Narrative     Not on file     Current Outpatient Medications   Medication Sig Dispense Refill     atorvastatin (LIPITOR) 10 MG tablet Take 1 tablet (10 mg) by mouth daily 90 tablet 1     Multiple Vitamins-Minerals (MULTIVITAMIN & MINERAL PO) Take 1 tablet by mouth daily       PARoxetine (PAXIL) 30 MG tablet Take 1 tablet (30 mg) by mouth daily 90 tablet 4     No Known Allergies    Review of Systems   Constitutional: Negative for activity change, appetite change, chills and fatigue.   Respiratory: Negative for cough and shortness of breath.    Cardiovascular: Negative for chest pain and peripheral edema.   Skin: Negative for rash and wound.   Psychiatric/Behavioral: Negative for behavioral problems, self-injury, sleep disturbance and suicidal ideas. The patient is nervous/anxious.         OBJECTIVE:     /80   Pulse 72   Temp 97.9  F (36.6  C) (Tympanic)   Resp 16   Ht 1.549 m (5' 1\")   Wt 59.5 kg (131 lb 4 oz)   LMP 05/28/2020 (Approximate)   BMI 24.80 kg/m    Body mass index is 24.8 kg/m .  Physical Exam  Vitals signs and nursing note reviewed.   Constitutional:       Appearance: Normal appearance. She is normal weight.   HENT:      Head: Normocephalic and atraumatic.      Right Ear: Tympanic membrane and ear canal normal.      Left Ear: Tympanic membrane and ear canal normal.   Neck:      Musculoskeletal: Normal range of motion and neck supple.   Cardiovascular:      Rate and Rhythm: " Normal rate and regular rhythm.   Pulmonary:      Effort: Pulmonary effort is normal.      Breath sounds: Normal breath sounds.   Abdominal:      Hernia: There is no hernia in the left inguinal area or right inguinal area.   Genitourinary:     General: Normal vulva.      Exam position: Prone.      Skyler stage (genital): 5.      Labia:         Right: No rash, tenderness, lesion or injury.         Left: No rash, tenderness, lesion or injury.       Vagina: Normal.      Cervix: Normal.      Uterus: Normal.       Adnexa: Right adnexa normal and left adnexa normal.   Lymphadenopathy:      Lower Body: No right inguinal adenopathy. No left inguinal adenopathy.   Skin:     Capillary Refill: Capillary refill takes less than 2 seconds.   Neurological:      General: No focal deficit present.      Mental Status: She is alert.   Psychiatric:         Mood and Affect: Mood normal.         Behavior: Behavior normal.     Diagnostic Test Results:  Results for orders placed or performed in visit on 06/16/20   Lipid Panel     Status: Abnormal   Result Value Ref Range    Cholesterol 229 (H) <200 mg/dL    Triglycerides 129 <150 mg/dL    HDL Cholesterol 61 23 - 92 mg/dL    LDL Cholesterol Calculated 142 (H) <100 mg/dL    Non HDL Cholesterol 168 (H) <130 mg/dL       ASSESSMENT/PLAN:     1. Cervical cancer screening  Collected today; no obvious findings grossly on exam.  - HPV High Risk Types DNA Cervical  - Pap Screen Thin Prep with HPV - recommended age 30 - 65 years (select HPV order below)    2. Elevated LDL cholesterol level  Recheck cholesterol panel today; if remains elevated, encouraged statin to help reduce risk of cardiovascular disease.  Has atorvastatin 10mg at home; but just hasn't started medication.  R/B/O of medications reviewed.  OK to take at night with paxil.  Regular exercise.  - Lipid Panel      Tegan Koch DO  [facility] AND \[facility]\""

## 2020-06-17 LAB
COPATH REPORT: NORMAL
PAP: NORMAL

## 2020-06-17 ASSESSMENT — ENCOUNTER SYMPTOMS
NERVOUS/ANXIOUS: 1
CHILLS: 0
SHORTNESS OF BREATH: 0
WOUND: 0
SLEEP DISTURBANCE: 0
FATIGUE: 0
APPETITE CHANGE: 0
COUGH: 0
ACTIVITY CHANGE: 0

## 2020-06-17 ASSESSMENT — ANXIETY QUESTIONNAIRES: GAD7 TOTAL SCORE: 8

## 2020-06-25 LAB
FINAL DIAGNOSIS: ABNORMAL
HPV HR 12 DNA CVX QL NAA+PROBE: POSITIVE
HPV16 DNA SPEC QL NAA+PROBE: NEGATIVE
HPV18 DNA SPEC QL NAA+PROBE: NEGATIVE
SPECIMEN DESCRIPTION: ABNORMAL
SPECIMEN SOURCE CVX/VAG CYTO: ABNORMAL

## 2020-07-08 ENCOUNTER — MYC MEDICAL ADVICE (OUTPATIENT)
Dept: FAMILY MEDICINE | Facility: OTHER | Age: 50
End: 2020-07-08

## 2020-08-16 DIAGNOSIS — F41.1 GAD (GENERALIZED ANXIETY DISORDER): Primary | ICD-10-CM

## 2020-08-16 RX ORDER — PAROXETINE 30 MG/1
30 TABLET, FILM COATED ORAL EVERY MORNING
Qty: 5 TABLET | Refills: 0 | Status: SHIPPED | OUTPATIENT
Start: 2020-08-16 | End: 2020-10-22

## 2020-08-16 NOTE — PROGRESS NOTES
Contacted by Inpatient pharmacist Stephanie; who was contacted by patient.  Attempted to fill from outpatient pharmacy on Friday, but after hours.  Out of paxil x 3 days now and becoming symptomatic.  Would like a few pills to go to [facility] so she can get back on them and  regular Rx on Monday at [facility] pharmacy.  Rx for #5 tablets to [facility] [place] today.    Tegan Koch, DO on 8/16/2020 at 10:29 AM

## 2020-10-21 ENCOUNTER — MYC MEDICAL ADVICE (OUTPATIENT)
Dept: FAMILY MEDICINE | Facility: OTHER | Age: 50
End: 2020-10-21

## 2020-10-22 ENCOUNTER — VIRTUAL VISIT (OUTPATIENT)
Dept: FAMILY MEDICINE | Facility: OTHER | Age: 50
End: 2020-10-22
Attending: FAMILY MEDICINE

## 2020-10-22 DIAGNOSIS — M54.16 LUMBAR BACK PAIN WITH RADICULOPATHY AFFECTING RIGHT LOWER EXTREMITY: ICD-10-CM

## 2020-10-22 DIAGNOSIS — E78.00 ELEVATED LDL CHOLESTEROL LEVEL: Primary | ICD-10-CM

## 2020-10-22 PROCEDURE — 99212 OFFICE O/P EST SF 10 MIN: CPT | Mod: 95 | Performed by: FAMILY MEDICINE

## 2020-10-22 RX ORDER — HYDROCODONE BITARTRATE AND ACETAMINOPHEN 5; 325 MG/1; MG/1
1 TABLET ORAL EVERY 4 HOURS PRN
Qty: 30 TABLET | Refills: 0 | Status: SHIPPED | OUTPATIENT
Start: 2020-10-22 | End: 2022-08-20

## 2020-10-22 RX ORDER — SIMVASTATIN 10 MG
10 TABLET ORAL AT BEDTIME
Qty: 90 TABLET | Refills: 1 | Status: SHIPPED | OUTPATIENT
Start: 2020-10-22 | End: 2021-04-23

## 2020-10-22 ASSESSMENT — ANXIETY QUESTIONNAIRES
2. NOT BEING ABLE TO STOP OR CONTROL WORRYING: NOT AT ALL
IF YOU CHECKED OFF ANY PROBLEMS ON THIS QUESTIONNAIRE, HOW DIFFICULT HAVE THESE PROBLEMS MADE IT FOR YOU TO DO YOUR WORK, TAKE CARE OF THINGS AT HOME, OR GET ALONG WITH OTHER PEOPLE: NOT DIFFICULT AT ALL
7. FEELING AFRAID AS IF SOMETHING AWFUL MIGHT HAPPEN: NOT AT ALL
1. FEELING NERVOUS, ANXIOUS, OR ON EDGE: SEVERAL DAYS
3. WORRYING TOO MUCH ABOUT DIFFERENT THINGS: SEVERAL DAYS
6. BECOMING EASILY ANNOYED OR IRRITABLE: NOT AT ALL
GAD7 TOTAL SCORE: 2
5. BEING SO RESTLESS THAT IT IS HARD TO SIT STILL: NOT AT ALL

## 2020-10-22 ASSESSMENT — PAIN SCALES - GENERAL: PAINLEVEL: SEVERE PAIN (6)

## 2020-10-22 ASSESSMENT — PATIENT HEALTH QUESTIONNAIRE - PHQ9
SUM OF ALL RESPONSES TO PHQ QUESTIONS 1-9: 2
5. POOR APPETITE OR OVEREATING: NOT AT ALL

## 2020-10-22 NOTE — NURSING NOTE
"Chief Complaint   Patient presents with     Recheck Medication       Initial LMP 10/08/2020 (Exact Date)  Estimated body mass index is 24.8 kg/m  as calculated from the following:    Height as of 6/16/20: 1.549 m (5' 1\").    Weight as of 6/16/20: 59.5 kg (131 lb 4 oz).  Medication Reconciliation: complete    Codi Nicolas LPN  "

## 2020-10-22 NOTE — PROGRESS NOTES
"Aruna Johnson is a 50 year old female who is being evaluated via a billable telephone visit.      The patient has been notified of following:     \"This telephone visit will be conducted via a call between you and your physician/provider. We have found that certain health care needs can be provided without the need for a physical exam.  This service lets us provide the care you need with a short phone conversation.  If a prescription is necessary we can send it directly to your pharmacy.  If lab work is needed we can place an order for that and you can then stop by our lab to have the test done at a later time.    Telephone visits are billed at different rates depending on your insurance coverage. During this emergency period, for some insurers they may be billed the same as an in-person visit.  Please reach out to your insurance provider with any questions.    If during the course of the call the physician/provider feels a telephone visit is not appropriate, you will not be charged for this service.\"  Patient has given verbal consent for Telephone visit?  Yes  What phone number would you like to be contacted at? [phone]  How would you like to obtain your AVS? MyChart    Subjective   Aruna Johnson is a 50 year old female who presents via phone visit today for the following health issues:    HPI  Arm pains/myalgias related to her atorvastatin.  Has been taking it for a few months; was thinking it would improve with time.  Interfering with daily activity.  Achy; rates 6/10 most of the time.    Also would like refill of her Norco; has had #30 for ~5 years.  Uses very intermittently with back strains/flares of pain.  Has physical job/housekeeping.    Review of Systems   CONSTITUTIONAL: NEGATIVE for fever, chills, change in weight  ENT/MOUTH: NEGATIVE for ear, mouth and throat problems  RESP: NEGATIVE for significant cough or SOB  CV: NEGATIVE for chest pain, palpitations or peripheral edema     Objective   Vitals - " Patient Reported  Pain Score: Severe Pain (6)  Pain Loc: Arm    healthy, alert and no distress  PSYCH: Alert and oriented times 3; coherent speech, normal   rate and volume, able to articulate logical thoughts, able   to abstract reason, no tangential thoughts, no hallucinations   or delusions  Her affect is normal  RESP: No cough, no audible wheezing, able to talk in full sentences  Remainder of exam unable to be completed due to telephone visits    No diagnostics        The 10-year ASCVD risk score (Maxwelton MICHAEL Jr., et al., 2013) is: 3.6%    Values used to calculate the score:      Age: 50 years      Sex: Female      Is Non- : No      Diabetic: No      Tobacco smoker: Yes      Systolic Blood Pressure: 126 mmHg      Is BP treated: No      HDL Cholesterol: 61 mg/dL      Total Cholesterol: 229 mg/dL      Assessment/Plan:  1. Elevated LDL cholesterol level  Discussed health benefits of statin.  Will attempt change from Lipitor to Zocor and monitor for improvement.  If none, can consider every other day dosing of crestor and increase if tolerated.  Encourage hydration.  - simvastatin (ZOCOR) 10 MG tablet; Take 1 tablet (10 mg) by mouth At Bedtime  Dispense: 90 tablet; Refill: 1    2. Lumbar back pain with radiculopathy affecting right lower extremity  Very intermittent.  MN  accessed and no concerns.  Refilled #30 for rare use.  Encourage core strengthening/good ergonomics/back health.  - HYDROcodone-acetaminophen (NORCO) 5-325 MG tablet; Take 1 tablet by mouth every 4 hours as needed for pain  Dispense: 30 tablet; Refill: 0      Phone call duration:  11 minutes  Start: 10:55 AM  Stop: 11:06 AM    Tegan Koch,    Family Practice

## 2020-10-23 ASSESSMENT — ANXIETY QUESTIONNAIRES: GAD7 TOTAL SCORE: 2

## 2020-12-27 ENCOUNTER — HEALTH MAINTENANCE LETTER (OUTPATIENT)
Age: 50
End: 2020-12-27

## 2021-02-15 ENCOUNTER — MYC MEDICAL ADVICE (OUTPATIENT)
Dept: FAMILY MEDICINE | Facility: OTHER | Age: 51
End: 2021-02-15

## 2021-02-15 DIAGNOSIS — F41.1 GAD (GENERALIZED ANXIETY DISORDER): ICD-10-CM

## 2021-02-16 DIAGNOSIS — F41.1 GAD (GENERALIZED ANXIETY DISORDER): ICD-10-CM

## 2021-02-16 RX ORDER — PAROXETINE 30 MG/1
30 TABLET, FILM COATED ORAL DAILY
Qty: 90 TABLET | Refills: 4 | OUTPATIENT
Start: 2021-02-16

## 2021-02-16 RX ORDER — PAROXETINE 30 MG/1
30 TABLET, FILM COATED ORAL DAILY
Qty: 90 TABLET | Refills: 4 | Status: SHIPPED | OUTPATIENT
Start: 2021-02-16 | End: 2022-05-20

## 2021-04-22 ENCOUNTER — MYC MEDICAL ADVICE (OUTPATIENT)
Dept: FAMILY MEDICINE | Facility: OTHER | Age: 51
End: 2021-04-22

## 2021-04-22 DIAGNOSIS — E78.00 ELEVATED LDL CHOLESTEROL LEVEL: ICD-10-CM

## 2021-04-23 DIAGNOSIS — E78.00 ELEVATED LDL CHOLESTEROL LEVEL: ICD-10-CM

## 2021-04-23 RX ORDER — SIMVASTATIN 10 MG
10 TABLET ORAL AT BEDTIME
Qty: 90 TABLET | Refills: 1 | Status: SHIPPED | OUTPATIENT
Start: 2021-04-23 | End: 2021-08-05

## 2021-04-25 ENCOUNTER — HEALTH MAINTENANCE LETTER (OUTPATIENT)
Age: 51
End: 2021-04-25

## 2021-04-27 RX ORDER — SIMVASTATIN 10 MG
10 TABLET ORAL AT BEDTIME
Qty: 90 TABLET | Refills: 1 | Status: SHIPPED | OUTPATIENT
Start: 2021-04-27 | End: 2021-08-17

## 2021-08-04 NOTE — PROGRESS NOTES
ANNUAL PHYSICAL - FEMALE    HPI: Aruna presents for a yearly exam.  Concerns include:  1. Pap smear.  Reassured with discussion.  2. Has abnormal moles; would like removed.    No LMP recorded.   Contraception: NA; periods spacing out.  None x 3 months.    Risk for STI?: No  Last pap: 6/16/2020, neg cytology; + HPV (other).  DUE  Any hx of abnormal paps:  See above.  FH of early CA?: + breast CA.  Cholesterol/DM concerns/screening: Due  Tobacco?: yes  Calcium intake: No  DEXA: NA  Last mammo: 5/5 and 5/7 in 2020 (required diagnostic images).  Normal, recommend yearly.  Colonoscopy: DUE  Immunizations: Tdap 4/17/2015; recommend flu yearly; Shingrix DUE; PNA @ 65/66.  PPSV23 now.  Consider Covid-19 vaccine.    Patient Active Problem List   Diagnosis     Lumbar back pain with radiculopathy affecting right lower extremity     Elevated LDL cholesterol level     MARY (generalized anxiety disorder)       No past medical history on file.    Past Surgical History:   Procedure Laterality Date     EXTRACTION(S) DENTAL  ~2000    + nausea from anesthesia       Social History     Socioeconomic History     Marital status:      Spouse name: Not on file     Number of children: Not on file     Years of education: Not on file     Highest education level: Not on file   Occupational History     Not on file   Tobacco Use     Smoking status: Current Every Day Smoker     Types: Cigarettes     Smokeless tobacco: Never Used     Tobacco comment: 5 cigarettes    Substance and Sexual Activity     Alcohol use: Yes     Comment: Alcoholic Drinks/day: couple drinks at night     Drug use: Never     Types: Other     Comment: Drug use: No     Sexual activity: Yes     Partners: Male   Other Topics Concern     Not on file   Social History Narrative     Not on file     Social Determinants of Health     Financial Resource Strain:      Difficulty of Paying Living Expenses:    Food Insecurity:      Worried About Running Out of Food in the Last Year:       Ran Out of Food in the Last Year:    Transportation Needs:      Lack of Transportation (Medical):      Lack of Transportation (Non-Medical):    Physical Activity:      Days of Exercise per Week:      Minutes of Exercise per Session:    Stress:      Feeling of Stress :    Social Connections:      Frequency of Communication with Friends and Family:      Frequency of Social Gatherings with Friends and Family:      Attends [employer] Services:      Active Member of Clubs or Organizations:      Attends Club or Organization Meetings:      Marital Status:    Intimate Partner Violence:      Fear of Current or Ex-Partner:      Emotionally Abused:      Physically Abused:      Sexually Abused:        Family History   Problem Relation Age of Onset     Breast Cancer Other      Cancer Mother         sarcoma - outside of stomach     No Known Problems Father      Breast Cancer Maternal Grandmother        Current Outpatient Medications   Medication Sig Dispense Refill     HYDROcodone-acetaminophen (NORCO) 5-325 MG tablet Take 1 tablet by mouth every 4 hours as needed for pain 30 tablet 0     Multiple Vitamins-Minerals (MULTIVITAMIN & MINERAL PO) Take 1 tablet by mouth daily       PARoxetine (PAXIL) 30 MG tablet Take 1 tablet (30 mg) by mouth daily 90 tablet 4     simvastatin (ZOCOR) 10 MG tablet Take 1 tablet (10 mg) by mouth At Bedtime 90 tablet 1     simvastatin (ZOCOR) 10 MG tablet Take 1 tablet (10 mg) by mouth At Bedtime 90 tablet 1        REVIEW OF SYSTEMS:  Refer to HPI; all other systems reviewed and negative.    PHYSICAL EXAM:  There were no vitals taken for this visit.  CONSTITUTIONAL:  Alert, cooperative, NAD.  EYES: No scleral icterus.  PERRLA.  Conjunctiva clear.  ENT/MOUTH: External ears and nose normal.  TMs normal.  Moist mucous membranes. Oropharynx clear.    ENDO: No thyromegaly or thyroid nodules.  LYMPH:  No cervical or supraclavicular LA.    BREASTS: No skin abnormalities, no erythema.  No discrete masses.   No nipple discharge, no axillary, supra- or infraclavicular LA.   CARDIOVASCULAR: Regular, S1, S2.  No S3 or S4.  No murmur/gallop/rub.  No peripheral edema.  RESPIRATORY: CTA bilaterally, no wheezes, rhonchi or rales.  GI: Bowel sounds wnl.  Soft, nontender, non-distended.  No masses or HSM.  No rebound or guarding.  : Vulva: normal, no lesions or discharge  Urethral meatus: normal size and location, no lesions or discharge  Urethra: no tenderness or masses  Bladder: no fullness or tenderness  Vagina: normal appearance, no abnormal discharge, no lesions.  No evidence of cystocele or rectocele.  Cervix: normal appearance, no lesions, no abnormal discharge.  Uterus: normal size and position, mobile, non-tender  Adnexa: nopalpable masses bilaterally. No cervical motion tenderness.  Pap smear obtained: Yes  MSKEL: Grossly normal ROM.  No clubbing.  INTEGUMENTARY:Warm, dry.  No rash noted on exposed skin.  Multi-colored pigmented mole on lateral lower R leg; 0.8cm.  Also likely dermatofibroma development within a mole, but atypical on medial lower L leg.   NEUROLOGIC: Facies symmetric.  Grossly normal movement and tone.  No tremor.  PSYCHIATRIC: Affect normal.  Speech fluent.      PHQ 6/16/2020 10/22/2020 8/5/2021   PHQ-9 Total Score 5 2 8   Q9: Thoughts of better off dead/self-harm past 2 weeks Not at all Not at all Not at all     MARY-7 SCORE 6/16/2020 10/22/2020 8/5/2021   Total Score 8 2 7       No results found for any visits on 08/05/21.    ASSESSMENT/PLAN:  1. Routine history and physical examination of adult  - Pap Screen Thin Prep with HPV - recommended age 30 - 65 years  - Basic Metabolic Panel; Future  - Lipid Panel; Future  - MA Screen Bilateral w/Raman; Future  - Adult Gastro Ref - Procedure Only; Future  - GH IMM-  PNEUMOCOCCAL VACCINE,ADULT,SQ OR IM  - Basic Metabolic Panel  - Lipid Panel    2. MARY (generalized anxiety disorder)    3. Elevated LDL cholesterol level  - Lipid Panel; Future  - Lipid  Panel    4. Dysthymia    5. Cervical cancer screening  - Pap Screen Thin Prep with HPV - recommended age 30 - 65 years    6. Visit for screening mammogram  - MA Screen Bilateral w/Raman; Future    7. Colon cancer screening  - Adult Gastro Ref - Procedure Only; Future    8. Lipid screening  - Lipid Panel; Future  - Lipid Panel    9. Diabetes mellitus screening  - Basic Metabolic Panel; Future  - Basic Metabolic Panel    10. Need for 23-polyvalent pneumococcal polysaccharide vaccine  - GH IMM-  PNEUMOCOCCAL VACCINE,ADULT,SQ OR IM    11. Need for shingles vaccine  Will consider; can get at pharmacy in future.    12. Atypical moles  Referred to [facility] for removal x2.    Relevant cancer screening discussed.    Counseled on healthy diet, Calcium and vitamin D intake, and exercise.    Tegan Koch, DO  Family Practice

## 2021-08-05 ENCOUNTER — OFFICE VISIT (OUTPATIENT)
Dept: FAMILY MEDICINE | Facility: OTHER | Age: 51
End: 2021-08-05
Attending: FAMILY MEDICINE
Payer: COMMERCIAL

## 2021-08-05 VITALS
WEIGHT: 150 LBS | HEART RATE: 92 BPM | HEIGHT: 62 IN | SYSTOLIC BLOOD PRESSURE: 120 MMHG | OXYGEN SATURATION: 96 % | BODY MASS INDEX: 27.6 KG/M2 | RESPIRATION RATE: 18 BRPM | DIASTOLIC BLOOD PRESSURE: 78 MMHG | TEMPERATURE: 97.4 F

## 2021-08-05 DIAGNOSIS — Z00.00 ROUTINE HISTORY AND PHYSICAL EXAMINATION OF ADULT: Primary | ICD-10-CM

## 2021-08-05 DIAGNOSIS — Z12.31 VISIT FOR SCREENING MAMMOGRAM: ICD-10-CM

## 2021-08-05 DIAGNOSIS — E78.00 ELEVATED LDL CHOLESTEROL LEVEL: ICD-10-CM

## 2021-08-05 DIAGNOSIS — Z13.220 LIPID SCREENING: ICD-10-CM

## 2021-08-05 DIAGNOSIS — Z23 NEED FOR SHINGLES VACCINE: ICD-10-CM

## 2021-08-05 DIAGNOSIS — Z12.11 COLON CANCER SCREENING: ICD-10-CM

## 2021-08-05 DIAGNOSIS — Z23 NEED FOR 23-POLYVALENT PNEUMOCOCCAL POLYSACCHARIDE VACCINE: ICD-10-CM

## 2021-08-05 DIAGNOSIS — F34.1 DYSTHYMIA: ICD-10-CM

## 2021-08-05 DIAGNOSIS — D22.9 ATYPICAL MOLE: ICD-10-CM

## 2021-08-05 DIAGNOSIS — Z13.1 DIABETES MELLITUS SCREENING: ICD-10-CM

## 2021-08-05 DIAGNOSIS — F41.1 GAD (GENERALIZED ANXIETY DISORDER): ICD-10-CM

## 2021-08-05 DIAGNOSIS — Z12.4 CERVICAL CANCER SCREENING: ICD-10-CM

## 2021-08-05 LAB
ANION GAP SERPL CALCULATED.3IONS-SCNC: 9 MMOL/L (ref 3–14)
BUN SERPL-MCNC: 23 MG/DL (ref 7–25)
CALCIUM SERPL-MCNC: 9.7 MG/DL (ref 8.6–10.3)
CHLORIDE BLD-SCNC: 103 MMOL/L (ref 98–107)
CHOLEST SERPL-MCNC: 194 MG/DL
CO2 SERPL-SCNC: 26 MMOL/L (ref 21–31)
CREAT SERPL-MCNC: 0.94 MG/DL (ref 0.6–1.2)
FASTING STATUS PATIENT QL REPORTED: ABNORMAL
GFR SERPL CREATININE-BSD FRML MDRD: 70 ML/MIN/1.73M2
GLUCOSE BLD-MCNC: 104 MG/DL (ref 70–105)
HDLC SERPL-MCNC: 53 MG/DL (ref 23–92)
LDLC SERPL CALC-MCNC: 93 MG/DL
NONHDLC SERPL-MCNC: 141 MG/DL
POTASSIUM BLD-SCNC: 4 MMOL/L (ref 3.5–5.1)
SODIUM SERPL-SCNC: 138 MMOL/L (ref 134–144)
TRIGL SERPL-MCNC: 239 MG/DL

## 2021-08-05 PROCEDURE — 90732 PPSV23 VACC 2 YRS+ SUBQ/IM: CPT | Performed by: FAMILY MEDICINE

## 2021-08-05 PROCEDURE — 80061 LIPID PANEL: CPT | Mod: ZL | Performed by: FAMILY MEDICINE

## 2021-08-05 PROCEDURE — 36415 COLL VENOUS BLD VENIPUNCTURE: CPT | Mod: ZL | Performed by: FAMILY MEDICINE

## 2021-08-05 PROCEDURE — 99396 PREV VISIT EST AGE 40-64: CPT | Mod: 25 | Performed by: FAMILY MEDICINE

## 2021-08-05 PROCEDURE — 80048 BASIC METABOLIC PNL TOTAL CA: CPT | Mod: ZL | Performed by: FAMILY MEDICINE

## 2021-08-05 PROCEDURE — G0123 SCREEN CERV/VAG THIN LAYER: HCPCS | Performed by: FAMILY MEDICINE

## 2021-08-05 PROCEDURE — 87624 HPV HI-RISK TYP POOLED RSLT: CPT | Mod: ZL | Performed by: FAMILY MEDICINE

## 2021-08-05 PROCEDURE — 90471 IMMUNIZATION ADMIN: CPT | Performed by: FAMILY MEDICINE

## 2021-08-05 ASSESSMENT — ANXIETY QUESTIONNAIRES
GAD7 TOTAL SCORE: 7
6. BECOMING EASILY ANNOYED OR IRRITABLE: SEVERAL DAYS
5. BEING SO RESTLESS THAT IT IS HARD TO SIT STILL: SEVERAL DAYS
2. NOT BEING ABLE TO STOP OR CONTROL WORRYING: SEVERAL DAYS
7. FEELING AFRAID AS IF SOMETHING AWFUL MIGHT HAPPEN: SEVERAL DAYS
IF YOU CHECKED OFF ANY PROBLEMS ON THIS QUESTIONNAIRE, HOW DIFFICULT HAVE THESE PROBLEMS MADE IT FOR YOU TO DO YOUR WORK, TAKE CARE OF THINGS AT HOME, OR GET ALONG WITH OTHER PEOPLE: SOMEWHAT DIFFICULT
1. FEELING NERVOUS, ANXIOUS, OR ON EDGE: SEVERAL DAYS
3. WORRYING TOO MUCH ABOUT DIFFERENT THINGS: SEVERAL DAYS

## 2021-08-05 ASSESSMENT — PATIENT HEALTH QUESTIONNAIRE - PHQ9
SUM OF ALL RESPONSES TO PHQ QUESTIONS 1-9: 8
5. POOR APPETITE OR OVEREATING: SEVERAL DAYS

## 2021-08-05 ASSESSMENT — MIFFLIN-ST. JEOR: SCORE: 1240.71

## 2021-08-05 ASSESSMENT — PAIN SCALES - GENERAL: PAINLEVEL: NO PAIN (0)

## 2021-08-06 ASSESSMENT — ANXIETY QUESTIONNAIRES: GAD7 TOTAL SCORE: 7

## 2021-08-12 ENCOUNTER — OFFICE VISIT (OUTPATIENT)
Dept: SURGERY | Facility: OTHER | Age: 51
End: 2021-08-12
Attending: FAMILY MEDICINE
Payer: COMMERCIAL

## 2021-08-12 VITALS
HEART RATE: 92 BPM | SYSTOLIC BLOOD PRESSURE: 122 MMHG | BODY MASS INDEX: 28.11 KG/M2 | DIASTOLIC BLOOD PRESSURE: 76 MMHG | WEIGHT: 151.2 LBS | TEMPERATURE: 97.4 F | RESPIRATION RATE: 16 BRPM

## 2021-08-12 DIAGNOSIS — D22.9 ATYPICAL MOLE: Primary | ICD-10-CM

## 2021-08-12 PROCEDURE — 11104 PUNCH BX SKIN SINGLE LESION: CPT | Performed by: SURGERY

## 2021-08-12 PROCEDURE — 88305 TISSUE EXAM BY PATHOLOGIST: CPT

## 2021-08-12 ASSESSMENT — PAIN SCALES - GENERAL: PAINLEVEL: NO PAIN (0)

## 2021-08-12 NOTE — PATIENT INSTRUCTIONS
Your incision was closed with stitches that will dissolve.      The stitches will take a couple of days    It is ok to get the incision wet in the shower on the day after your procedure.     Don't soak in a tub, pool or lake for 5 days.

## 2021-08-12 NOTE — PROGRESS NOTES
Procedure Note     Pre/Post Operative Diagnosis:   Pigmented skin lesion right lower extremity    Procedure:    Biopsy of right lower extremity pigmented lesion    Surgeon: ABBE Mccall MD     Local Anesthesia: 1% lidocaine with0.25%Marcaine with epinephrine    Indication for the procedure:    This is a 51 year old female patient with pigmented lesion on the right lower extremity.  Patient is had this lesion for several years and she feels that the slowly getting larger.  She is significant history of sun exposure.  No previous history of skin cancer.  No family history of melanoma or skin cancer.  Clinically, this is a 0.9 x 0.7 cm macular pigmented lesion on the lateral right lower leg.  Likely solar lentigo.  We will plan for biopsy to rule out malignancy or atypical mole.  After explaining the risks to include bleeding, infection, recurrence or need for re-excision, and scarring the patient wished to proceed.    Procedure:   The area was prepped and draped in usual sterile fashion with ChloraPrep. After adequate local anesthesia, a 3 mm punch biopsy was taken from the periphery of the pigmented lesion.  The resultant defect was closed with a figure-of-eight 5-0 plain gut suture which closed the wound and stop bleeding.  The skin was cleansed and the wound was covered in a clean bandage.  The specimen was passed off to be sent down to lab.  Patient tolerated the procedure well.    Plan:  The patient will be called with pathology results.  Patient will followup if there any problems with the wound including redness or drainage.      ABBE Mccall MD

## 2021-08-14 ENCOUNTER — HEALTH MAINTENANCE LETTER (OUTPATIENT)
Age: 51
End: 2021-08-14

## 2021-08-16 LAB
BKR LAB AP GYN ADEQUACY: NORMAL
BKR LAB AP GYN INTERPRETATION: NORMAL
BKR LAB AP HPV REFLEX: NORMAL
BKR LAB AP PREVIOUS ABNL DX: NORMAL
BKR LAB AP PREVIOUS ABNORMAL: NORMAL
PATH REPORT.COMMENTS IMP SPEC: NORMAL
PATH REPORT.FINAL DX SPEC: NORMAL
PATH REPORT.RELEVANT HX SPEC: NORMAL
PHOTO IMAGE: NORMAL

## 2021-08-17 ENCOUNTER — MYC MEDICAL ADVICE (OUTPATIENT)
Dept: FAMILY MEDICINE | Facility: OTHER | Age: 51
End: 2021-08-17

## 2021-08-17 DIAGNOSIS — E78.00 ELEVATED LDL CHOLESTEROL LEVEL: ICD-10-CM

## 2021-08-17 LAB
HUMAN PAPILLOMA VIRUS 16 DNA: NEGATIVE
HUMAN PAPILLOMA VIRUS 18 DNA: NEGATIVE
HUMAN PAPILLOMA VIRUS FINAL DIAGNOSIS: NORMAL
HUMAN PAPILLOMA VIRUS OTHER HR: NEGATIVE

## 2021-08-17 RX ORDER — SIMVASTATIN 10 MG
10 TABLET ORAL AT BEDTIME
Qty: 90 TABLET | Refills: 4 | Status: SHIPPED | OUTPATIENT
Start: 2021-08-17 | End: 2022-08-19

## 2021-10-09 ENCOUNTER — HEALTH MAINTENANCE LETTER (OUTPATIENT)
Age: 51
End: 2021-10-09

## 2022-05-19 ENCOUNTER — MYC MEDICAL ADVICE (OUTPATIENT)
Dept: FAMILY MEDICINE | Facility: OTHER | Age: 52
End: 2022-05-19
Payer: COMMERCIAL

## 2022-05-20 ENCOUNTER — TELEPHONE (OUTPATIENT)
Dept: FAMILY MEDICINE | Facility: OTHER | Age: 52
End: 2022-05-20
Payer: COMMERCIAL

## 2022-05-20 DIAGNOSIS — F41.1 GAD (GENERALIZED ANXIETY DISORDER): ICD-10-CM

## 2022-05-20 RX ORDER — PAROXETINE 30 MG/1
30 TABLET, FILM COATED ORAL DAILY
Qty: 90 TABLET | Refills: 1 | Status: SHIPPED | OUTPATIENT
Start: 2022-05-20 | End: 2022-08-19

## 2022-05-20 NOTE — TELEPHONE ENCOUNTER
Pending Prescriptions:                       Disp   Refills    PARoxetine (PAXIL) 30 MG tablet           90 tab*0            Sig: Take 1 tablet (30 mg) by mouth daily

## 2022-05-20 NOTE — TELEPHONE ENCOUNTER
Message left on patient's voicemail informing her of refill sent     Mary Jane Upton CMA on 5/20/2022 at 11:59 AM

## 2022-05-20 NOTE — TELEPHONE ENCOUNTER
Patient is scheduled for her Physical with [facility] on 8/19/22. Patient states that she will be out of her Paxil medication prior to this appointment and wondering if [facility] is able to fill that for her to get her to that appointment. Please advise    Felecia Joseph on 5/20/2022 at 10:00 AM

## 2022-05-23 ENCOUNTER — MYC MEDICAL ADVICE (OUTPATIENT)
Dept: FAMILY MEDICINE | Facility: OTHER | Age: 52
End: 2022-05-23
Payer: COMMERCIAL

## 2022-06-02 ENCOUNTER — MYC MEDICAL ADVICE (OUTPATIENT)
Dept: FAMILY MEDICINE | Facility: OTHER | Age: 52
End: 2022-06-02
Payer: COMMERCIAL

## 2022-06-10 ENCOUNTER — TELEPHONE (OUTPATIENT)
Dept: FAMILY MEDICINE | Facility: OTHER | Age: 52
End: 2022-06-10
Payer: COMMERCIAL

## 2022-06-10 DIAGNOSIS — R53.83 FATIGUE, UNSPECIFIED TYPE: ICD-10-CM

## 2022-06-10 DIAGNOSIS — Z00.00 ROUTINE HISTORY AND PHYSICAL EXAMINATION OF ADULT: ICD-10-CM

## 2022-06-10 DIAGNOSIS — E78.00 ELEVATED LDL CHOLESTEROL LEVEL: Primary | ICD-10-CM

## 2022-06-10 DIAGNOSIS — Z13.220 LIPID SCREENING: ICD-10-CM

## 2022-06-10 NOTE — TELEPHONE ENCOUNTER
Patient would like a lab order to get her thyroid checked.  Please call      Nuha Polanco on 6/10/2022 at 12:02 PM

## 2022-06-10 NOTE — TELEPHONE ENCOUNTER
Left message to call back. Why is she requesting this? She is not on any thyroid medications.     Mary Jane Uptno, CMA on 6/10/2022 at 12:25 PM

## 2022-06-10 NOTE — TELEPHONE ENCOUNTER
Talked with patient and she states that she has been very sluggish, weak and out of breath. She has a physical scheduled in August but is wondering if she could get her labs done ahead of time and include a thyroid check in there? Okay for Tuesday 6/14 when Dr. Koch is back in the office.  Codi Nicolas LPN, LPN  6/10/2022  12:52 PM

## 2022-06-14 NOTE — TELEPHONE ENCOUNTER
Patient notified and transferred to the appointment line.  Codi Nicolas LPN, LPN  6/14/2022  8:37 AM

## 2022-06-15 ENCOUNTER — MYC MEDICAL ADVICE (OUTPATIENT)
Dept: FAMILY MEDICINE | Facility: OTHER | Age: 52
End: 2022-06-15

## 2022-06-15 ENCOUNTER — LAB (OUTPATIENT)
Dept: LAB | Facility: OTHER | Age: 52
End: 2022-06-15
Attending: FAMILY MEDICINE
Payer: COMMERCIAL

## 2022-06-15 DIAGNOSIS — E78.00 ELEVATED LDL CHOLESTEROL LEVEL: ICD-10-CM

## 2022-06-15 DIAGNOSIS — Z13.220 LIPID SCREENING: ICD-10-CM

## 2022-06-15 DIAGNOSIS — R53.83 FATIGUE, UNSPECIFIED TYPE: ICD-10-CM

## 2022-06-15 DIAGNOSIS — Z00.00 ROUTINE HISTORY AND PHYSICAL EXAMINATION OF ADULT: ICD-10-CM

## 2022-06-15 LAB
ANION GAP SERPL CALCULATED.3IONS-SCNC: 8 MMOL/L (ref 3–14)
BASOPHILS # BLD AUTO: 0 10E3/UL (ref 0–0.2)
BASOPHILS NFR BLD AUTO: 0 %
BUN SERPL-MCNC: 14 MG/DL (ref 7–25)
CALCIUM SERPL-MCNC: 9.6 MG/DL (ref 8.6–10.3)
CHLORIDE BLD-SCNC: 105 MMOL/L (ref 98–107)
CHOLEST SERPL-MCNC: 214 MG/DL
CO2 SERPL-SCNC: 27 MMOL/L (ref 21–31)
CREAT SERPL-MCNC: 0.96 MG/DL (ref 0.6–1.2)
EOSINOPHIL # BLD AUTO: 0.2 10E3/UL (ref 0–0.7)
EOSINOPHIL NFR BLD AUTO: 3 %
ERYTHROCYTE [DISTWIDTH] IN BLOOD BY AUTOMATED COUNT: 13.2 % (ref 10–15)
FASTING STATUS PATIENT QL REPORTED: YES
FERRITIN SERPL-MCNC: 19 NG/ML (ref 24–336)
GFR SERPL CREATININE-BSD FRML MDRD: 71 ML/MIN/1.73M2
GLUCOSE BLD-MCNC: 94 MG/DL (ref 70–105)
HCT VFR BLD AUTO: 42.2 % (ref 35–47)
HDLC SERPL-MCNC: 57 MG/DL (ref 23–92)
HGB BLD-MCNC: 14.6 G/DL (ref 11.7–15.7)
IMM GRANULOCYTES # BLD: 0 10E3/UL
IMM GRANULOCYTES NFR BLD: 0 %
LDLC SERPL CALC-MCNC: 131 MG/DL
LYMPHOCYTES # BLD AUTO: 1.7 10E3/UL (ref 0.8–5.3)
LYMPHOCYTES NFR BLD AUTO: 38 %
MCH RBC QN AUTO: 30.5 PG (ref 26.5–33)
MCHC RBC AUTO-ENTMCNC: 34.6 G/DL (ref 31.5–36.5)
MCV RBC AUTO: 88 FL (ref 78–100)
MONOCYTES # BLD AUTO: 0.4 10E3/UL (ref 0–1.3)
MONOCYTES NFR BLD AUTO: 10 %
NEUTROPHILS # BLD AUTO: 2.2 10E3/UL (ref 1.6–8.3)
NEUTROPHILS NFR BLD AUTO: 49 %
NONHDLC SERPL-MCNC: 157 MG/DL
NRBC # BLD AUTO: 0 10E3/UL
NRBC BLD AUTO-RTO: 0 /100
PLATELET # BLD AUTO: 231 10E3/UL (ref 150–450)
POTASSIUM BLD-SCNC: 4.1 MMOL/L (ref 3.5–5.1)
RBC # BLD AUTO: 4.78 10E6/UL (ref 3.8–5.2)
SODIUM SERPL-SCNC: 140 MMOL/L (ref 134–144)
T4 FREE SERPL-MCNC: 0.77 NG/DL (ref 0.6–1.6)
TRIGL SERPL-MCNC: 131 MG/DL
TSH SERPL DL<=0.005 MIU/L-ACNC: 3.87 MU/L (ref 0.4–4)
WBC # BLD AUTO: 4.5 10E3/UL (ref 4–11)

## 2022-06-15 PROCEDURE — 36415 COLL VENOUS BLD VENIPUNCTURE: CPT | Mod: ZL

## 2022-06-15 PROCEDURE — 84443 ASSAY THYROID STIM HORMONE: CPT | Mod: ZL

## 2022-06-15 PROCEDURE — 82728 ASSAY OF FERRITIN: CPT | Mod: ZL

## 2022-06-15 PROCEDURE — 85025 COMPLETE CBC W/AUTO DIFF WBC: CPT | Mod: ZL

## 2022-06-15 PROCEDURE — 84439 ASSAY OF FREE THYROXINE: CPT | Mod: ZL

## 2022-06-15 PROCEDURE — 80048 BASIC METABOLIC PNL TOTAL CA: CPT | Mod: ZL

## 2022-06-15 PROCEDURE — 80061 LIPID PANEL: CPT | Mod: ZL

## 2022-07-12 ENCOUNTER — OFFICE VISIT (OUTPATIENT)
Dept: FAMILY MEDICINE | Facility: OTHER | Age: 52
End: 2022-07-12
Attending: PHYSICIAN ASSISTANT
Payer: COMMERCIAL

## 2022-07-12 VITALS
WEIGHT: 144.2 LBS | RESPIRATION RATE: 16 BRPM | HEART RATE: 71 BPM | DIASTOLIC BLOOD PRESSURE: 74 MMHG | TEMPERATURE: 97.4 F | HEIGHT: 60 IN | SYSTOLIC BLOOD PRESSURE: 118 MMHG | OXYGEN SATURATION: 98 % | BODY MASS INDEX: 28.31 KG/M2

## 2022-07-12 DIAGNOSIS — K13.0 CHEILITIS: Primary | ICD-10-CM

## 2022-07-12 PROCEDURE — 99213 OFFICE O/P EST LOW 20 MIN: CPT | Performed by: NURSE PRACTITIONER

## 2022-07-12 RX ORDER — TRIAMCINOLONE ACETONIDE 1 MG/G
CREAM TOPICAL 2 TIMES DAILY PRN
Qty: 30 G | Refills: 0 | Status: SHIPPED | OUTPATIENT
Start: 2022-07-12

## 2022-07-12 ASSESSMENT — PAIN SCALES - GENERAL: PAINLEVEL: NO PAIN (0)

## 2022-07-12 NOTE — PROGRESS NOTES
ASSESSMENT/PLAN:     I have reviewed the nursing notes.  I have reviewed the findings, diagnosis, plan and need for follow up with the patient.      1. Cheilitis    - triamcinolone (KENALOG) 0.1 % external cream; Apply topically 2 times daily as needed for irritation  Dispense: 30 g; Refill: 0    Irritation, redness, flaking, discomfort and swelling of her lips and surrounding skin tissue for the past month.  Exam is most consistent with cheilitis.      Trial of moderate potency steroid cream.  Continue to use Vaseline.  Discussed avoidance of offending products and licking lips, etc.    Discussed warning signs/symptoms indicative of need to f/u  Follow up if symptoms persist without improvement in 2 weeks or worsen or concerns      I explained my diagnostic considerations and recommendations to the patient, who voiced understanding and agreement with the treatment plan. All questions were answered. We discussed potential side effects of any prescribed or recommended therapies, as well as expectations for response to treatments.    Madeleine Ruggiero NP  [facility]      SUBJECTIVE:   Aruna Johnson is a 52 year old female who presents to clinic today for the following health issues:  Lip swelling    HPI  Patient reports she has been having intermittent lip swelling upon waking up in the mornings over the past month.  States it occurs for about 3 days then resolves until the next episode.  States her lips feel irritated and chapped as well.  Skin surrounding lips are red.  She can feel oozing around the skin of the lips.    Though maybe she was having an allergic reaction to a vitamin - HACC, but has not noted any difference since stopping it.  No throat swelling or difficulty swallowing.  No chest tightness, heaviness, or shortness of breath.   No fevers.  No body rashes.  Patient has been using Vaseline.          History reviewed. No pertinent past medical history.  Past Surgical History:    Procedure Laterality Date     EXTRACTION(S) DENTAL  ~2000    + nausea from anesthesia     Social History     Tobacco Use     Smoking status: Current Every Day Smoker     Types: Cigarettes     Smokeless tobacco: Never Used     Tobacco comment: 5 cigarettes, ready cut back   Substance Use Topics     Alcohol use: Yes     Comment: Weekends     Current Outpatient Medications   Medication Sig Dispense Refill     Multiple Vitamins-Minerals (MULTIVITAMIN & MINERAL PO) Take 1 tablet by mouth daily       PARoxetine (PAXIL) 30 MG tablet Take 1 tablet (30 mg) by mouth daily 90 tablet 1     simvastatin (ZOCOR) 10 MG tablet Take 1 tablet (10 mg) by mouth At Bedtime 90 tablet 4     HYDROcodone-acetaminophen (NORCO) 5-325 MG tablet Take 1 tablet by mouth every 4 hours as needed for pain (Patient not taking: Reported on 7/12/2022) 30 tablet 0     No Known Allergies      Past medical history, past surgical history, current medications and allergies reviewed and accurate to the best of my knowledge.        OBJECTIVE:     /74 (BP Location: Right arm, Patient Position: Sitting, Cuff Size: Adult Regular)   Pulse 71   Temp 97.4  F (36.3  C) (Tympanic)   Resp 16   Ht 1.524 m (5')   Wt 65.4 kg (144 lb 3.2 oz)   LMP 10/12/2021 (Within Months)   SpO2 98%   Breastfeeding No   BMI 28.16 kg/m    Body mass index is 28.16 kg/m .     Physical Exam  General Appearance: Well appearing adult female, appropriate appearance for age. No acute distress  Orophayrnx: Upper and lower lips with minimal swelling, no difficulty swallowing, tip of tongue with canker ulcer, voice clear.    Respiratory: normal chest wall and respirations.  Normal effort.   No cough appreciated.  Dermatological: Skin surrounding upper and lower lips with mild erythema, irritation, mild scaling, and mild fissures, no pustules or vesicles, no crusting.  Psychological: normal affect, alert, oriented, and pleasant.

## 2022-07-12 NOTE — NURSING NOTE
Patient presents to the clinic today for a possible allergic reaction.  Patient states that for her past month when she wakes up her lips are swollen on occasion.  Patient states that she has just been using Vaseline on her lips.  Anjelica Shankar LPN 7/12/2022   10:10 AM    Chief Complaint   Patient presents with     Mouth/Lip Problem       Initial /74 (BP Location: Right arm, Patient Position: Sitting, Cuff Size: Adult Regular)   Pulse 71   Temp 97.4  F (36.3  C) (Tympanic)   Resp 16   Ht 1.524 m (5')   Wt 65.4 kg (144 lb 3.2 oz)   LMP 10/12/2021 (Within Months)   SpO2 98%   Breastfeeding No   BMI 28.16 kg/m   Estimated body mass index is 28.16 kg/m  as calculated from the following:    Height as of this encounter: 1.524 m (5').    Weight as of this encounter: 65.4 kg (144 lb 3.2 oz).  Medication Reconciliation: complete  Anjelica Shankar LPN

## 2022-08-19 ENCOUNTER — OFFICE VISIT (OUTPATIENT)
Dept: FAMILY MEDICINE | Facility: OTHER | Age: 52
End: 2022-08-19
Attending: FAMILY MEDICINE
Payer: COMMERCIAL

## 2022-08-19 VITALS
SYSTOLIC BLOOD PRESSURE: 108 MMHG | HEIGHT: 61 IN | RESPIRATION RATE: 14 BRPM | WEIGHT: 144.5 LBS | HEART RATE: 84 BPM | DIASTOLIC BLOOD PRESSURE: 70 MMHG | TEMPERATURE: 97.5 F | OXYGEN SATURATION: 99 % | BODY MASS INDEX: 27.28 KG/M2

## 2022-08-19 DIAGNOSIS — E78.00 ELEVATED LDL CHOLESTEROL LEVEL: ICD-10-CM

## 2022-08-19 DIAGNOSIS — Z12.4 CERVICAL CANCER SCREENING: ICD-10-CM

## 2022-08-19 DIAGNOSIS — F41.1 GAD (GENERALIZED ANXIETY DISORDER): ICD-10-CM

## 2022-08-19 DIAGNOSIS — Z12.11 COLON CANCER SCREENING: ICD-10-CM

## 2022-08-19 DIAGNOSIS — M54.16 LUMBAR BACK PAIN WITH RADICULOPATHY AFFECTING RIGHT LOWER EXTREMITY: ICD-10-CM

## 2022-08-19 DIAGNOSIS — N95.1 PERIMENOPAUSAL SYMPTOMS: ICD-10-CM

## 2022-08-19 DIAGNOSIS — Z12.31 VISIT FOR SCREENING MAMMOGRAM: ICD-10-CM

## 2022-08-19 DIAGNOSIS — Z00.00 ROUTINE HISTORY AND PHYSICAL EXAMINATION OF ADULT: Primary | ICD-10-CM

## 2022-08-19 PROCEDURE — G0123 SCREEN CERV/VAG THIN LAYER: HCPCS | Performed by: FAMILY MEDICINE

## 2022-08-19 PROCEDURE — 87624 HPV HI-RISK TYP POOLED RSLT: CPT | Mod: ZL | Performed by: FAMILY MEDICINE

## 2022-08-19 PROCEDURE — 99396 PREV VISIT EST AGE 40-64: CPT | Performed by: FAMILY MEDICINE

## 2022-08-19 RX ORDER — SIMVASTATIN 10 MG
10 TABLET ORAL AT BEDTIME
Qty: 90 TABLET | Refills: 4 | Status: SHIPPED | OUTPATIENT
Start: 2022-08-19 | End: 2023-08-22

## 2022-08-19 RX ORDER — PAROXETINE 40 MG/1
40 TABLET, FILM COATED ORAL DAILY
Qty: 90 TABLET | Refills: 4 | Status: SHIPPED | OUTPATIENT
Start: 2022-08-19 | End: 2023-08-22

## 2022-08-19 ASSESSMENT — ANXIETY QUESTIONNAIRES
GAD7 TOTAL SCORE: 8
8. IF YOU CHECKED OFF ANY PROBLEMS, HOW DIFFICULT HAVE THESE MADE IT FOR YOU TO DO YOUR WORK, TAKE CARE OF THINGS AT HOME, OR GET ALONG WITH OTHER PEOPLE?: SOMEWHAT DIFFICULT
2. NOT BEING ABLE TO STOP OR CONTROL WORRYING: SEVERAL DAYS
7. FEELING AFRAID AS IF SOMETHING AWFUL MIGHT HAPPEN: SEVERAL DAYS
1. FEELING NERVOUS, ANXIOUS, OR ON EDGE: SEVERAL DAYS
7. FEELING AFRAID AS IF SOMETHING AWFUL MIGHT HAPPEN: SEVERAL DAYS
GAD7 TOTAL SCORE: 8
4. TROUBLE RELAXING: SEVERAL DAYS
3. WORRYING TOO MUCH ABOUT DIFFERENT THINGS: SEVERAL DAYS
6. BECOMING EASILY ANNOYED OR IRRITABLE: MORE THAN HALF THE DAYS
IF YOU CHECKED OFF ANY PROBLEMS ON THIS QUESTIONNAIRE, HOW DIFFICULT HAVE THESE PROBLEMS MADE IT FOR YOU TO DO YOUR WORK, TAKE CARE OF THINGS AT HOME, OR GET ALONG WITH OTHER PEOPLE: SOMEWHAT DIFFICULT
5. BEING SO RESTLESS THAT IT IS HARD TO SIT STILL: SEVERAL DAYS
GAD7 TOTAL SCORE: 8

## 2022-08-19 ASSESSMENT — ENCOUNTER SYMPTOMS
PALPITATIONS: 0
CHILLS: 0
HEMATOCHEZIA: 0
FEVER: 0
COUGH: 0
PARESTHESIAS: 0
JOINT SWELLING: 0
MYALGIAS: 1
ABDOMINAL PAIN: 0
SHORTNESS OF BREATH: 0
DIZZINESS: 0
FREQUENCY: 0
ARTHRALGIAS: 1
BREAST MASS: 0
SORE THROAT: 0
NERVOUS/ANXIOUS: 0
CONSTIPATION: 0
DIARRHEA: 0
HEADACHES: 0
HEMATURIA: 0
HEARTBURN: 0
EYE PAIN: 0
DYSURIA: 0
WEAKNESS: 0

## 2022-08-19 ASSESSMENT — PATIENT HEALTH QUESTIONNAIRE - PHQ9
10. IF YOU CHECKED OFF ANY PROBLEMS, HOW DIFFICULT HAVE THESE PROBLEMS MADE IT FOR YOU TO DO YOUR WORK, TAKE CARE OF THINGS AT HOME, OR GET ALONG WITH OTHER PEOPLE: SOMEWHAT DIFFICULT
SUM OF ALL RESPONSES TO PHQ QUESTIONS 1-9: 8
SUM OF ALL RESPONSES TO PHQ QUESTIONS 1-9: 8

## 2022-08-19 ASSESSMENT — PAIN SCALES - GENERAL: PAINLEVEL: NO PAIN (0)

## 2022-08-19 NOTE — LETTER
My Depression Action Plan  Name: Aruna Johnson   Date of Birth 1970  Date: 8/19/2022    My doctor: Tegan Koch   My clinic: [facility]  [address]  [place] [id number] [phone]          GREEN    ZONE   Good Control    What it looks like:     Things are going generally well. You have normal ups and downs. You may even feel depressed from time to time, but bad moods usually last less than a day.   What you need to do:  1. Continue to care for yourself (see self care plan)  2. Check your depression survival kit and update it as needed  3. Follow your physician s recommendations including any medication.  4. Do not stop taking medication unless you consult with your physician first.           YELLOW         ZONE Getting Worse    What it looks like:     Depression is starting to interfere with your life.     It may be hard to get out of bed; you may be starting to isolate yourself from others.    Symptoms of depression are starting to last most all day and this has happened for several days.     You may have suicidal thoughts but they are not constant.   What you need to do:     1. Call your care team. Your response to treatment will improve if you keep your care team informed of your progress. Yellow periods are signs an adjustment may need to be made.     2. Continue your self-care.  Just get dressed and ready for the day.  Don't give yourself time to talk yourself out of it.    3. Talk to someone in your support network.    4. Open up your Depression Self-Care Plan/Wellness Kit.           RED    ZONE Medical Alert - Get Help    What it looks like:     Depression is seriously interfering with your life.     You may experience these or other symptoms: You can t get out of bed most days, can t work or engage in other necessary activities, you have trouble taking care of basic hygiene, or basic responsibilities, thoughts of suicide or death that will not go  away, self-injurious behavior.     What you need to do:  1. Call your care team and request a same-day appointment. If they are not available (weekends or after hours) call your local crisis line, emergency room or 911.          Depression Self-Care Plan / Wellness Kit    Many people find that medication and therapy are helpful treatments for managing depression. In addition, making small changes to your everyday life can help to boost your mood and improve your wellbeing. Below are some tips for you to consider. Be sure to talk with your medical provider and/or behavioral health consultant if your symptoms are worsening or not improving.     Sleep   Sleep hygiene  means all of the habits that support good, restful sleep. It includes maintaining a consistent bedtime and wake time, using your bedroom only for sleeping or sex, and keeping the bedroom dark and free of distractions like a computer, smartphone, or television.     Develop a Healthy Routine  Maintain good hygiene. Get out of bed in the morning, make your bed, brush your teeth, take a shower, and get dressed. Don t spend too much time viewing media that makes you feel stressed. Find time to relax each day.    Exercise  Get some form of exercise every day. This will help reduce pain and release endorphins, the  feel good  chemicals in your brain. It can be as simple as just going for a walk or doing some gardening, anything that will get you moving.      Diet  Strive to eat healthy foods, including fruits and vegetables. Drink plenty of water. Avoid excessive sugar, caffeine, alcohol, and other mood-altering substances.     Stay Connected with Others  Stay in touch with friends and family members.    Manage Your Mood  Try deep breathing, massage therapy, biofeedback, or meditation. Take part in fun activities when you can. Try to find something to smile about each day.     Psychotherapy  Be open to working with a therapist if your provider recommends it.      Medication  Be sure to take your medication as prescribed. Most anti-depressants need to be taken every day. It usually takes several weeks for medications to work. Not all medicines work for all people. It is important to follow-up with your provider to make sure you have a treatment plan that is working for you. Do not stop your medication abruptly without first discussing it with your provider.    Crisis Resources   These hotlines are for both adults and children. They and are open 24 hours a day, 7 days a week unless noted otherwise.      National Suicide Prevention Lifeline   988 or 6-751-826-ACEX (1572)      Crisis Text Line    www.crisistextline.org  Text HOME to 936655 from anywhere in the [place], anytime, about any type of crisis. A live, trained crisis counselor will receive the text and respond quickly.      [place] Lifeline for LGBTQ Youth  A national crisis intervention and suicide lifeline for LGBTQ youth under 25. Provides a safe place to talk without judgement. Call 1-934.715.7155; text START to 265369 or visit www.thetrevorproject.org to talk to a trained counselor.      For [facility] crisis numbers, visit the [facility] website at:  https://[place].gov/[place]hs/people-we-serve/adults/health-care/mental-health/resources/crisis-contacts.jsp

## 2022-08-19 NOTE — NURSING NOTE
"Chief Complaint   Patient presents with     Physical       Initial /70   Pulse 84   Temp 97.5  F (36.4  C) (Tympanic)   Resp 14   Ht 1.543 m (5' 0.75\")   Wt 65.5 kg (144 lb 8 oz)   LMP 10/22/2021 (LMP Unknown)   SpO2 99%   BMI 27.53 kg/m   Estimated body mass index is 27.53 kg/m  as calculated from the following:    Height as of this encounter: 1.543 m (5' 0.75\").    Weight as of this encounter: 65.5 kg (144 lb 8 oz).  Medication Reconciliation: complete    Codi Nicolas LPN     Advanced Care Directive reviewed.     "

## 2022-08-19 NOTE — PROGRESS NOTES
ANNUAL PHYSICAL - FEMALE  Family Practice    HPI: Aruna presents for a yearly exam.  Concerns include:  1. Perimenopausal symptoms.  More labile mood/irritabilty.  On paxil 30mg.  Not wanting a ton of interventions, but wondering if anything else effective.  Some vaginal dryness, but coconut oil helps.  2. Continues to have rare intermittent back pain flares.  Last filled Norco 10/2020.  Would like refill today.    No LMP recorded. Patient is perimenopausal.   Contraception: NA; hasn't used anything x 25+ years.  Risk for STI?: No  Last pap: 8/5/2021, neg co testing.  DUE  Any hx of abnormal paps:  2020: Neg/+ HPV.  FH of early CA?: + breast CA.  Cholesterol/DM concerns/screening: Due  Tobacco?: yes, not yet candidate for LDCT screening  Calcium intake: No  DEXA: NA  Last mammo: 5/5 and 5/7 in 2020 (required diagnostic images).  Normal.  DUE.  Colonoscopy: DUE  Immunizations: Tdap 4/17/2015; recommend flu yearly; Shingrix DUE; PNA @ 65/66.  PPSV23 8/5/2021.  Consider Covid-19 vaccine.    Patient Active Problem List   Diagnosis     Lumbar back pain with radiculopathy affecting right lower extremity     Elevated LDL cholesterol level     MARY (generalized anxiety disorder)       No past medical history on file.    Past Surgical History:   Procedure Laterality Date     EXTRACTION(S) DENTAL  ~2000    + nausea from anesthesia       Social History     Socioeconomic History     Marital status:    Tobacco Use     Smoking status: Current Every Day Smoker     Types: Cigarettes     Smokeless tobacco: Never Used     Tobacco comment: 5 cigarettes, ready cut back   Vaping Use     Vaping Use: Never used   Substance and Sexual Activity     Alcohol use: Yes     Comment: Weekends     Drug use: Never     Sexual activity: Yes     Partners: Male       Family History   Problem Relation Age of Onset     Breast Cancer Other      Cancer Mother         sarcoma - outside of stomach     No Known Problems Father      Breast Cancer  Maternal Grandmother        Current Outpatient Medications   Medication Sig Dispense Refill     HYDROcodone-acetaminophen (NORCO) 5-325 MG tablet Take 1 tablet by mouth every 4 hours as needed for pain (Patient not taking: Reported on 7/12/2022) 30 tablet 0     Multiple Vitamins-Minerals (MULTIVITAMIN & MINERAL PO) Take 1 tablet by mouth daily       PARoxetine (PAXIL) 30 MG tablet Take 1 tablet (30 mg) by mouth daily 90 tablet 1     simvastatin (ZOCOR) 10 MG tablet Take 1 tablet (10 mg) by mouth At Bedtime 90 tablet 4     triamcinolone (KENALOG) 0.1 % external cream Apply topically 2 times daily as needed for irritation 30 g 0        REVIEW OF SYSTEMS:  Refer to HPI; all other systems reviewed and negative.    PHYSICAL EXAM:  There were no vitals taken for this visit.  CONSTITUTIONAL:  Alert, cooperative, NAD.  EYES: No scleral icterus.  PERRLA.  Conjunctiva clear.  ENT/MOUTH: External ears and nose normal.  TMs normal.  Moist mucous membranes. Oropharynx clear.    ENDO: No thyromegaly or thyroid nodules.  LYMPH:  No cervical or supraclavicular LA.    BREASTS: No skin abnormalities, no erythema.  No discrete masses.  No nipple discharge, no axillary, supra- or infraclavicular LA.   CARDIOVASCULAR: Regular, S1, S2.  No S3 or S4.  No murmur/gallop/rub.  No peripheral edema.  RESPIRATORY: CTA bilaterally, no wheezes, rhonchi or rales.  GI: Bowel sounds wnl.  Soft, nontender, non-distended.  No masses or HSM.  No rebound or guarding.  : Vulva: normal, no lesions or discharge  Urethral meatus: normal size and location, no lesions or discharge  Urethra: no tenderness or masses  Bladder: no fullness or tenderness  Vagina: normal appearance, no abnormal discharge, no lesions.  No evidence of cystocele or rectocele.  Cervix: atrophic with less prominence but otherwise normal appearance, no lesions, no abnormal discharge.  Uterus: normal size and position, mobile, non-tender  Adnexa: nopalpable masses bilaterally. No  cervical motion tenderness.  Pap smear obtained: yes  MSKEL: Grossly normal ROM.  No clubbing.  INTEGUMENTARY:Warm, dry.  No rash noted on exposed skin.  NEUROLOGIC: Facies symmetric.  Grossly normal movement and tone.  No tremor.  PSYCHIATRIC: Affect normal.  Speech fluent.      PHQ 6/16/2020 10/22/2020 8/5/2021   PHQ-9 Total Score 5 2 8   Q9: Thoughts of better off dead/self-harm past 2 weeks Not at all Not at all Not at all     MARY-7 SCORE 6/16/2020 10/22/2020 8/5/2021   Total Score 8 2 7       No results found for any visits on 08/19/22.    ASSESSMENT/PLAN:  1. Routine history and physical examination of adult    2. Cervical cancer screening  Collected due to + HPV in 2020.  If normal, will extend screening.  - Pap Screen with HPV - recommended age 30 - 65 years    3. Perimenopausal symptoms  Chronic, worse.  10 months since last menses.  Reassured re: menopausal changes.  Doing relatively well overall.  Plan to increase Paxil to 40mg daily over fall/winter months, re-evaluate in spring.  No exogenous estrogen products due to smoking history.  Discussed topical estrogen if worsening atrophic changes/symptoms.    4. MARY (generalized anxiety disorder)  Chronic, worsening - likely related to perimenopausal changes as discussed above.  Stress reduction, healthy eating, regular exercise; attempts at smoking cessation recommended.  Increase paxil to 40mg and re-evaluate spring 2023.  - PARoxetine (PAXIL) 40 MG tablet; Take 1 tablet (40 mg) by mouth daily  Dispense: 90 tablet; Refill: 4    5. Lumbar back pain with radiculopathy affecting right lower extremity  Chronic, waxes and wanes.  Overall stable.  Norco renewed for flares.  If worsening, will consider imaging and SpineX therapy.  - HYDROcodone-acetaminophen (NORCO) 5-325 MG tablet; Take 1 tablet by mouth every 4 hours as needed for pain  Dispense: 30 tablet; Refill: 0    6. Elevated LDL cholesterol level  Chronic, stable.   Monitoring labs relatively recent that  were okay.  Suspect some perimenopausal changes.  If remains slightly higher next year - would increase to 20mg daily or consider change to rosuvastatin 10mg.  - simvastatin (ZOCOR) 10 MG tablet; Take 1 tablet (10 mg) by mouth At Bedtime  Dispense: 90 tablet; Refill: 4    7. Colon cancer screening  - COLOGDWIGHT([employer])    8. Visit for screening mammogram  - MA Screen Bilateral w/Raman; Future      Relevant cancer screening discussed.    Counseled on healthy diet, Calcium and vitamin D intake, and exercise.    Tegan Koch, [facility]

## 2022-08-20 RX ORDER — HYDROCODONE BITARTRATE AND ACETAMINOPHEN 5; 325 MG/1; MG/1
1 TABLET ORAL EVERY 4 HOURS PRN
Qty: 30 TABLET | Refills: 0 | Status: SHIPPED | OUTPATIENT
Start: 2022-08-20 | End: 2023-08-22

## 2022-08-30 LAB
BKR LAB AP GYN ADEQUACY: ABNORMAL
BKR LAB AP GYN INTERPRETATION: ABNORMAL
BKR LAB AP HPV REFLEX: ABNORMAL
BKR LAB AP PREVIOUS ABNL DX: ABNORMAL
BKR LAB AP PREVIOUS ABNORMAL: ABNORMAL
PATH REPORT.COMMENTS IMP SPEC: ABNORMAL
PATH REPORT.COMMENTS IMP SPEC: ABNORMAL
PATH REPORT.RELEVANT HX SPEC: ABNORMAL

## 2022-09-10 LAB — NONINV COLON CA DNA+OCC BLD SCRN STL QL: NEGATIVE

## 2022-09-17 ENCOUNTER — HEALTH MAINTENANCE LETTER (OUTPATIENT)
Age: 52
End: 2022-09-17

## 2022-09-20 ENCOUNTER — HOSPITAL ENCOUNTER (OUTPATIENT)
Dept: MAMMOGRAPHY | Facility: OTHER | Age: 52
Discharge: HOME | End: 2022-09-20
Attending: FAMILY MEDICINE | Admitting: FAMILY MEDICINE
Payer: COMMERCIAL

## 2022-09-20 DIAGNOSIS — Z12.31 VISIT FOR SCREENING MAMMOGRAM: ICD-10-CM

## 2022-09-20 PROCEDURE — 77067 SCR MAMMO BI INCL CAD: CPT

## 2022-09-27 ENCOUNTER — THERAPY VISIT (OUTPATIENT)
Dept: CHIROPRACTIC MEDICINE | Facility: OTHER | Age: 52
End: 2022-09-27
Attending: CHIROPRACTOR
Payer: COMMERCIAL

## 2022-09-27 VITALS — TEMPERATURE: 96.8 F | HEART RATE: 88 BPM | OXYGEN SATURATION: 96 % | RESPIRATION RATE: 16 BRPM

## 2022-09-27 DIAGNOSIS — M62.838 MUSCLE SPASMS OF NECK: ICD-10-CM

## 2022-09-27 DIAGNOSIS — M99.01 SEGMENTAL AND SOMATIC DYSFUNCTION OF CERVICAL REGION: Primary | ICD-10-CM

## 2022-09-27 DIAGNOSIS — M99.02 SEGMENTAL AND SOMATIC DYSFUNCTION OF THORACIC REGION: ICD-10-CM

## 2022-09-27 DIAGNOSIS — M54.2 CERVICALGIA: ICD-10-CM

## 2022-09-27 PROCEDURE — 99213 OFFICE O/P EST LOW 20 MIN: CPT | Mod: 25 | Performed by: CHIROPRACTOR

## 2022-09-27 PROCEDURE — 98940 CHIROPRACT MANJ 1-2 REGIONS: CPT | Mod: AT | Performed by: CHIROPRACTOR

## 2022-09-27 NOTE — PROGRESS NOTES
"Started a couple weeks ago. Neck is having intermittent spasms with decreased ROM when it is bad. 6/10 W24 10/10.   Amanda Steve on 9/27/2022 at 1:19 PM    Reviewed by EW    PATIENT:  Aruna Johnson is a 52 year old female presenting for neck pain with headaches    PROBLEM:   Date of Initial Visit for this Episode:  9/27/2022    Visit #1    SUBJECTIVE / HPI: Patient presents with primary complaints of neck pain with headache symptoms that also extends into the upper trapezius muscle bilaterally.  Patient states that her symptoms feel \"different than if he were to have slept wrong.\"  No specific causative factors such as traumatic events or overuse history prior to onset of symptoms.  Denies any radicular complaints of the upper extremities however patient is experiencing headache symptoms when her neck is spasming.  Denies any loss of bowel or bladder function, saddle paresthesia or weakness of the upper or lower extremities.  No vision changes.  Patient notes that she is currently going through menopause.    (DVPRS) Pain Rating Score : Hard to ignore, avoid usual activities (W24 10/10) (09/27/22 1316)    See flowsheets in chart for details.  9/27/2022    Neck Disability Index (  Nirmal FRIEDMAN. and Svitlana BASSETT. 1991. All rights reserved.; used with permission) 9/27/2022   SECTION 1 - PAIN INTENSITY 2   SECTION 2 - PERSONAL CARE 0   SECTION 3 - LIFTING 2   SECTION 4 - READING 1   SECTION 5 - HEADACHES 3   SECTION 6 - CONCENTRATION 0   SECTION 7 - WORK 3   SECTION 8 - DRIVING 2   SECTION 9 - SLEEPING 0   SECTION 10 - RECREATION 2   Count 10   Sum 15   Raw Score: /50 15   Neck Disability Index Score: (%) 30        PAST MEDICAL HISTORY:  No past medical history on file.    PAST SURGICAL HISTORY:  Past Surgical History:   Procedure Laterality Date     EXTRACTION(S) DENTAL  ~2000    + nausea from anesthesia       ALLERGIES:  No Known Allergies    CURRENT MEDICATIONS:  Current Outpatient Medications   Medication Sig Dispense " Refill     HYDROcodone-acetaminophen (NORCO) 5-325 MG tablet Take 1 tablet by mouth every 4 hours as needed for pain 30 tablet 0     Multiple Vitamins-Minerals (MULTIVITAMIN & MINERAL PO) Take 1 tablet by mouth daily       PARoxetine (PAXIL) 40 MG tablet Take 1 tablet (40 mg) by mouth daily 90 tablet 4     simvastatin (ZOCOR) 10 MG tablet Take 1 tablet (10 mg) by mouth At Bedtime 90 tablet 4     triamcinolone (KENALOG) 0.1 % external cream Apply topically 2 times daily as needed for irritation 30 g 0       SOCIAL HISTORY:  Social History     Socioeconomic History     Marital status:    Tobacco Use     Smoking status: Current Every Day Smoker     Types: Cigarettes     Smokeless tobacco: Never Used     Tobacco comment: 5 cigarettes, ready cut back   Vaping Use     Vaping Use: Never used   Substance and Sexual Activity     Alcohol use: Yes     Comment: Weekends     Drug use: Never     Sexual activity: Yes     Partners: Male        FAMILY HISTORY:  Family History   Problem Relation Age of Onset     Breast Cancer Other      Cancer Mother         sarcoma - outside of stomach     No Known Problems Father      Breast Cancer Maternal Grandmother        Patient Active Problem List   Diagnosis     Lumbar back pain with radiculopathy affecting right lower extremity     Elevated LDL cholesterol level     MARY (generalized anxiety disorder)         ROS:  The patient denies any fevers, chills, nausea, vomiting, diarrhea, constipation,dysuria, hematuria, or urinary hesitancy or incontinence.  No shortness of breath, chest pain, or rashes.    OBJECTIVE:    DIAGNOSTICS:  No current spinal imaging taken.     PHYSICAL EXAM:   Pulse 88   Temp 96.8  F (36  C) (Tympanic)   Resp 16   LMP 10/22/2021 (LMP Unknown)   SpO2 96%    GENERAL APPEARANCE: healthy, alert, mild distress and cooperative   GAIT: NORMAL      MUSCULOSKELETAL:   Posture: Anterior head carriage, rounded shoulders.      Gait:  unremarkable.     Cervical performed  actively, measured approximately  ROM:   smooth/halting arc of motion   50/50 flexion    45/45 extension    35/45 RLF  35/45 LLF    75/85 RR         70/85 LR       -Maximal Foraminal Compression: +focal mild neck pain.   -Distraction improves    +Tenderness: C2, C3, C4 mildly L>>R, left side T4  +Muscle spasm: cervical paraspinals bilaterally into the upper thoracic region bilaterally   +Joint asymmetry and restriction: C2 extension left lateral flexion restriction, C4 extension right lateral flexion restriction, T4 extension restriction    ASSESSMENT: Aruna Johnson is a 52 year old female presenting with primary complaints of neck pain, headaches and spasms.  Segmental/somatic dysfunction is present of the cervical spine and upper thoracic spine both of which are consistent with patient's subjective reports.  Chiropractic care does seem appropriate given today's exam findings.  No contraindications precluding patient from receiving care today.     1. Segmental and somatic dysfunction of cervical region    2. Segmental and somatic dysfunction of thoracic region    3. Cervicalgia    4. Muscle spasms of neck        PLAN    Evaluation and Management:  89748 Moderate exam established patient 20 min    Procedures:  Modalities:  None performed this visit    CMT:  [id number] Chiropractic manipulative treatment 1-2 regions performed   Cervical: Diversified, C2, C4, Supine  Thoracic: Diversified, T4, Prone    Therapeutic procedures:  None    Response to Treatment  Reduction in symptoms as reported by patient    Prognosis: Good    9/27/2022 Plan of Care:  3-6 visits of Chiropractic Care including Spinal Adjustments and/or physiotherapy and active rehabilitation, to include exercises in the office and/or at home to meet care plan goals.     Frequency: 1-2xweek for up to 4 weeks. A reevaluation would be clinically appropriate in 6 visits, to determine progress and further course of care.    POC discussed and patient agreeable to  plan of care.      9/27/2022 Goals:      Patient will report improved pain.   Patient will report able to work without painful limitations.   Patient will report able to drive without painful limitations.   Patient will demonstrate an improved ability to complete Activities of Daily Living  as shown by a reported 10-30% reduced score on neck index.    Patient will demonstrate improved ROM.        INSTRUCTIONS   ice 20 minutes every other hour as needed and heat 15 minutes every other hour as needed    Follow-up:  Return to care in 5 days.

## 2022-10-05 ENCOUNTER — THERAPY VISIT (OUTPATIENT)
Dept: CHIROPRACTIC MEDICINE | Facility: OTHER | Age: 52
End: 2022-10-05
Attending: CHIROPRACTOR
Payer: COMMERCIAL

## 2022-10-05 VITALS
TEMPERATURE: 97.1 F | HEART RATE: 74 BPM | RESPIRATION RATE: 16 BRPM | OXYGEN SATURATION: 98 % | DIASTOLIC BLOOD PRESSURE: 72 MMHG | SYSTOLIC BLOOD PRESSURE: 110 MMHG

## 2022-10-05 DIAGNOSIS — M99.02 SEGMENTAL AND SOMATIC DYSFUNCTION OF THORACIC REGION: ICD-10-CM

## 2022-10-05 DIAGNOSIS — M54.2 CERVICALGIA: ICD-10-CM

## 2022-10-05 DIAGNOSIS — M99.01 SEGMENTAL AND SOMATIC DYSFUNCTION OF CERVICAL REGION: Primary | ICD-10-CM

## 2022-10-05 PROCEDURE — 98940 CHIROPRACT MANJ 1-2 REGIONS: CPT | Mod: AT | Performed by: CHIROPRACTOR

## 2022-10-05 NOTE — PROGRESS NOTES
Neck is intermittently sore. 1/10 W24 3/10. Using heat and ice which provides relief.  Amanda Steve on 10/5/2022 at 1:22 PM    Reviewed by EW    Visit #:  2/3-6    Subjective:  Aruna Johnson is a 52 year old female who is seen in f/u up for:        Segmental and somatic dysfunction of cervical region  Segmental and somatic dysfunction of thoracic region  Cervicalgia.     Since last visit on 9/27/2022,  Aruna Johnson reports: Symptoms are doing better.  Noted that upon returning to work however following our visit pain was noted with cervical flexion and rotation.  Patient has been off work for a couple of days and feels that her symptoms are showing some improvement at this time.      (DVPRS) Pain Rating Score : Hardly notice pain (W24 3/10) (10/05/22 1319)     Objective:  The following was observed:  /72 (BP Location: Right arm, Patient Position: Sitting, Cuff Size: Adult Regular)   Pulse 74   Temp 97.1  F (36.2  C) (Tympanic)   Resp 16   LMP 10/22/2021 (LMP Unknown)   SpO2 98%      P: palpatory tendernessSub-occipital Bilaterally  A: static palpation demonstrates intersegmental asymmetry , cervical, thoracic  R: motion palpation notes restricted motion, C1 , C2  and T4   T: muscle spasm at level(s): thoracic paraspinals around T4, suboccipitals bilaterally:      Segmental spinal dysfunction/restrictions found at:  :  C1 Left rotation restricted and Right lateral flexion restricted  C2 Right rotation restricted, Left lateral flexion restricted and Extension restriction  T4 Extension restriction.      Assessment: Patient does appear to be making progress.  Patient provided with strengthening exercise.  Plan to follow-up with patient in 1 week    Diagnoses:      1. Segmental and somatic dysfunction of cervical region    2. Segmental and somatic dysfunction of thoracic region    3. Cervicalgia        Patient's condition:  Patient had restrictions pre-manipulation    Treatment effectiveness:  Post  manipulation there is better intersegmental movement and Patient claims to feel looser post manipulation      Procedures:  CMT:  77005 Chiropractic manipulative treatment 1-2 regions performed   Cervical: Diversified, C1 , C2, Supine  Thoracic: Diversified, T4, Prone    Modalities:  None performed this visit    Therapeutic procedures:  Neck isometric strengthening exercises    Response to Treatment  Reduction in symptoms as reported by patient    Prognosis: Good    Progress towards Goals: Patient is making progress towards the goal.     Recommendations:    Instructions:perform neck strengthening as discussed and as tolerated.    Follow-up:  Return to care in 1 week.

## 2022-10-12 ENCOUNTER — THERAPY VISIT (OUTPATIENT)
Dept: CHIROPRACTIC MEDICINE | Facility: OTHER | Age: 52
End: 2022-10-12
Attending: CHIROPRACTOR
Payer: COMMERCIAL

## 2022-10-12 VITALS — HEART RATE: 86 BPM | RESPIRATION RATE: 16 BRPM | TEMPERATURE: 97.8 F | OXYGEN SATURATION: 97 %

## 2022-10-12 DIAGNOSIS — M54.2 CERVICALGIA: ICD-10-CM

## 2022-10-12 DIAGNOSIS — M99.01 SEGMENTAL AND SOMATIC DYSFUNCTION OF CERVICAL REGION: Primary | ICD-10-CM

## 2022-10-12 PROCEDURE — 98940 CHIROPRACT MANJ 1-2 REGIONS: CPT | Mod: AT | Performed by: CHIROPRACTOR

## 2022-10-12 NOTE — PROGRESS NOTES
"Neck is occasionally sharp. Shoots up into head causing headache. Doing stretches which thinks it is helping.   Amanda Steve on 10/12/2022 at 1:37 PM    Reviewed by EW    Visit #:  3/3-6    Subjective:  Aruna Johnson is a 52 year old female who is seen in f/u up for:        Segmental and somatic dysfunction of cervical region  Cervicalgia.     Since last visit on 10/5/2022,  Aruna Johnson reports: Symptoms doing better since last encounter.  Has done home exercises on a few occasions and does seem to find them beneficial.  Notes that pain has changed location and feels more \"central\" patient points to the mid cervical spine region.  Notes that cervical rotation feels better however working 10 hours straight recently did cause symptoms to flare somewhat.    (DVPRS) Pain Rating Score : No pain (W24 3/10) (10/12/22 1322)     Objective:  The following was observed:  Pulse 86   Temp 97.8  F (36.6  C) (Tympanic)   Resp 16   LMP 10/22/2021 (LMP Unknown)   SpO2 97%      P: palpatory tendernessC3/4, left upper trapezius and rhomboids:    A: static palpation demonstrates intersegmental asymmetry , cervical  R: motion palpation notes restricted motion, C3  and C4   T: muscle spasm at level(s): Left upper trapezius, left rhomboids:      Segmental spinal dysfunction/restrictions found at:  :  C3 Left lateral flexion restricted and Extension restriction  C4 Right lateral flexion restricted and Extension restriction.      Assessment: Patient showing good signs of progress.  Encourage patient to perform neck isometric strengthening exercises discussed on prior visit at least 3 times a week if not more.  Plan to follow-up with patient in 1 week at which time I believe patient will be at or nearing MMI.    Diagnoses:      1. Segmental and somatic dysfunction of cervical region    2. Cervicalgia        Patient's condition:  Patient had restrictions pre-manipulation and Patient symptoms are gradually improving    Treatment " effectiveness:  Post manipulation there is better intersegmental movement, Patient claims to feel looser post manipulation and Symptoms appear to be decreasing      Procedures:  CMT:  43972 Chiropractic manipulative treatment 1-2 regions performed   Cervical: Diversified, C3 , C4, Supine    Modalities:  None performed this visit    Therapeutic procedures:  None    Response to Treatment  Reduction in symptoms as reported by patient    Prognosis: Good    Progress towards Goals: Patient is making progress towards the goal.     Recommendations:    Instructions:Continue with neck strengthening exercises as discussed on prior visit and monitor symptoms closely    Follow-up:  Return to care in 1 week.

## 2022-10-13 NOTE — PATIENT INSTRUCTIONS
Continue with neck strengthening exercises as discussed on prior visit and monitor symptoms closely

## 2022-10-19 ENCOUNTER — THERAPY VISIT (OUTPATIENT)
Dept: CHIROPRACTIC MEDICINE | Facility: OTHER | Age: 52
End: 2022-10-19
Attending: CHIROPRACTOR
Payer: COMMERCIAL

## 2022-10-19 VITALS — TEMPERATURE: 97.5 F | RESPIRATION RATE: 16 BRPM | HEART RATE: 77 BPM | OXYGEN SATURATION: 97 %

## 2022-10-19 DIAGNOSIS — M54.2 CERVICALGIA: ICD-10-CM

## 2022-10-19 DIAGNOSIS — M99.01 SEGMENTAL AND SOMATIC DYSFUNCTION OF CERVICAL REGION: Primary | ICD-10-CM

## 2022-10-19 PROCEDURE — 98940 CHIROPRACT MANJ 1-2 REGIONS: CPT | Mod: AT | Performed by: CHIROPRACTOR

## 2022-10-19 NOTE — PROGRESS NOTES
Neck is frequently pinching. 5/10 W24 8/10.   Amanda Steve on 10/19/2022 at 1:33 PM    Reviewed by EW    Visit #:  4/3-6    Subjective:  Aruna Johnson is a 52 year old female who is seen in f/u up for:        Segmental and somatic dysfunction of cervical region  Cervicalgia.     Since last visit on 10/12/2022,  Aruna Johnson reports: Symptoms seem to be more pronounced on today's visit.  No specific aggravating factors such as traumatic events however patient continues to note that symptoms will be aggravated due to work related tasks.  Patient noted on prior visits that flexion and rotation motions seem to be major contributing factors for the symptoms.  No reported numbness or tingling from her neck.  Patient does have history of carpal tunnel which does not seem to be affected due to neck pain.  Patient also notes that her lower back seems to be more pronounced on today's visit as well.  Prior to this patient had been doing quite well    Discussed possibility of pursuing cervical spine x-rays or physical therapy which patient does seem to be in favor of pursuing.  Patient had been provided physical therapy exercises by our office and notes that she has been doing them.  While these did seem to provide some level of help at this time that does not appear to be the case.    (DVPRS) Pain Rating Score : Interrupts some activities (W24 8/10) (10/19/22 1328)     Objective:  The following was observed:  Pulse 77   Temp 97.5  F (36.4  C) (Tympanic)   Resp 16   SpO2 97%    Neck Disability Index (  Nirmal H. and Svitlana C. 1991. All rights reserved.; used with permission) 10/19/2022   SECTION 1 - PAIN INTENSITY 2   SECTION 2 - PERSONAL CARE 0   SECTION 3 - LIFTING 0   SECTION 4 - READING 4   SECTION 5 - HEADACHES 3   SECTION 6 - CONCENTRATION 0   SECTION 7 - WORK 2   SECTION 8 - DRIVING 2   SECTION 9 - SLEEPING 0   SECTION 10 - RECREATION 3   Count 10   Sum 16   Raw Score: /50 16   Neck Disability Index Score: (%) 32      P: palpatory tendernessC3/4:    A: static palpation demonstrates intersegmental asymmetry , cervical  R: motion palpation notes restricted motion, C4   T: muscle spasm at level(s): cervical paraspinals bilaterally:      Segmental spinal dysfunction/restrictions found at:  :  C4 Left rotation restricted and Extension restriction.      Assessment: Patient symptoms continue to remain quite elevated.  I was able to consult with patient's primary care provider for cosignature of x-ray and physical therapy orders which was agreed as a good neck step of care.  Chiropractic care seems to have provided some level of help and I do believe that further progress can be made with this course of treatment however patient voiced concerns of insurance coverage.  Patient will check on insurance coverage regarding chiropractic care before pursuing any additional chiropractic treatments.    X-rays are being ordered to assess for arthritis or other osseous concerns which may be hindering progress of care.    Diagnoses:      1. Segmental and somatic dysfunction of cervical region    2. Cervicalgia        Patient's condition:  Patient had restrictions pre-manipulation    Treatment effectiveness:  Post manipulation there is better intersegmental movement      Procedures:  CMT:  89766 Chiropractic manipulative treatment 1-2 regions performed   Cervical: Diversified, C4, Supine    Modalities:  None performed this visit    Therapeutic procedures:  None    Response to Treatment  Reduction in symptoms as reported by patient    Prognosis: Good    Progress towards Goals: in progress    Recommendations:    Instructions:ice 20 minutes every other hour as needed and heat 15 minutes every other hour as needed    Follow-up:  Return to care in 1 week if insurance coverage allows.

## 2022-10-27 ENCOUNTER — HOSPITAL ENCOUNTER (OUTPATIENT)
Dept: GENERAL RADIOLOGY | Facility: OTHER | Age: 52
Discharge: HOME | End: 2022-10-27
Attending: CHIROPRACTOR | Admitting: CHIROPRACTOR
Payer: COMMERCIAL

## 2022-10-27 PROCEDURE — 72040 X-RAY EXAM NECK SPINE 2-3 VW: CPT

## 2022-10-31 ENCOUNTER — TELEPHONE (OUTPATIENT)
Dept: CHIROPRACTIC MEDICINE | Facility: OTHER | Age: 52
End: 2022-10-31
Payer: COMMERCIAL

## 2022-10-31 DIAGNOSIS — M50.30 DEGENERATION OF CERVICAL INTERVERTEBRAL DISC: Primary | ICD-10-CM

## 2022-10-31 NOTE — TELEPHONE ENCOUNTER
Called patient's mobile number to review cervical spine films performed on 10/27/2022.  No answer.  Message was left for patient to call back.

## 2022-11-03 ENCOUNTER — MYC MEDICAL ADVICE (OUTPATIENT)
Dept: FAMILY MEDICINE | Facility: OTHER | Age: 52
End: 2022-11-03

## 2022-11-03 DIAGNOSIS — M47.812 OSTEOARTHRITIS OF CERVICAL SPINE, UNSPECIFIED SPINAL OSTEOARTHRITIS COMPLICATION STATUS: Primary | ICD-10-CM

## 2022-11-03 RX ORDER — CELECOXIB 100 MG/1
100 CAPSULE ORAL 2 TIMES DAILY
Qty: 60 CAPSULE | Refills: 2 | Status: SHIPPED | OUTPATIENT
Start: 2022-11-03 | End: 2023-08-22

## 2022-12-08 ENCOUNTER — HOSPITAL ENCOUNTER (OUTPATIENT)
Dept: PHYSICAL THERAPY | Facility: OTHER | Age: 52
Setting detail: THERAPIES SERIES
Discharge: HOME | End: 2022-12-08
Attending: CHIROPRACTOR
Payer: COMMERCIAL

## 2022-12-08 PROCEDURE — 97110 THERAPEUTIC EXERCISES: CPT | Mod: GP

## 2022-12-08 PROCEDURE — 97161 PT EVAL LOW COMPLEX 20 MIN: CPT | Mod: GP

## 2022-12-08 NOTE — PROGRESS NOTES
12/08/22 1200   General Information   Type of Visit Initial OP Ortho PT Evaluation   Start of Care Date 12/08/22   Referring Physician Tegan Koch DO/Meet Celis DC   Orders Evaluate and Treat   Date of Order 10/20/22   Certification Required? No   Medical Diagnosis Cervicalgia (M54.2)   Surgical/Medical history reviewed Yes   Body Part(s)   Body Part(s) Cervical Spine   Presentation and Etiology   Pertinent history of current problem (include personal factors and/or comorbidities that impact the POC) Pt is referred to PT due to cervicalgia. She has had a course of chiropractic treatment with Meet Celis DC, and was then referred to PT for further management as she was not making any lasting progress. Notes she has been good, not having any pain for a month. For awhile, it was not going away and then it suddenly was better. She has been doing her exercises with the stretchy band. Notes that she does note have any pain at all. It is just gone. Notes that she does not have any limitations in her activities.  Notes that she is not having any headaches. 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 Looking over her shoulder (driving) is now fine. Stress too is a factor. Aggravating factors: no issues now. Concerned with her posture as she is getting a small hump in her upper back. Easing factors: heat and exercises, hydrocodone. Cervical x-rays: 10-19-22 FINDINGS: No acute fracture. Disc height loss and uncovertebral hypertrophy is best seen at C4-5 and C5-6, associated with trace retrolisthesis at those levels. IMPRESSION: Degenerative changes most pronounced at C4-5 and C5-6. Past medical hx/comorbidities:  LBP with R LE radiculopathy, MARY   Impairments A. Pain;E. Decreased flexibility;D. Decreased ROM;N. Headaches  (were earlier symptoms--none reported at present time.)   Functional Limitations   (No restrictions now. Concerned with posture.)    Symptom Location L>R upper back and cervical   How/Where did it occur From insidious onset   Onset date of current episode/exacerbation 10/01/22   Chronicity New   Pain rating (0-10 point scale) Denies pain   Pain quality A. Sharp;E. Shooting;F. Stabbing  (when pain was present)   Pain/symptoms exacerbated by L. Work tasks   Pain/symptoms eased by G. Heat;K. Other   Pain eased by comment exercises   Progression of symptoms since onset: Improved   Prior Level of Function   Functional Level Prior Comment No prior issue with neck pain limiting function. Longstanding issues with LBP   Current Level of Function   Patient role/employment history A. Employed   Employment Comments [employer]--manager   Living environment [place]   Fall Risk Screen   Fall screen completed by PT   Have you fallen 2 or more times in the past year? No   Have you fallen and had an injury in the past year? No   Is patient a fall risk? No   Abuse Screen (yes response referral indicated)   Feels Unsafe at Home or Work/School no   Feels Threatened by Someone no   Does Anyone Try to Keep You From Having Contact with Others or Doing Things Outside Your Home? no   Physical Signs of Abuse Present no   Cervical Spine   Cervical Left Side Bending ROM WFL   Cervical Right Rotation ROM 55 degrees   Cervical Left Rotation ROM 55 degrees   Cervical Flexion ROM WNL   Cervical Extension ROM decreased   Cervical Right Side Bending ROM WFL   Thoracic Extension ROM decreased   Shoulder AROM Screen WFL   Upper Trapezius Flexibility decreased   Levator Scapula Flexibility decreased   Scalene Flexibility decreased   Pectoralis Minor Flexibility decreased   Segmental Mobility-Cervical Limited L to R lateral glides C3 to C7.   Segmental Mobility-Thoracic FRS R C7T1, L T12, R T23, T34, T45, ERS R C7T1. Good mobility of L ribs 1 and 2 with slight end range restriction both inhalation and exhalation R.   Palpation General listening to the L lower cervical region  laterally. Postural loading: head L/L, R/L, shoulders L/L proximal and R/R, L/R distally. Pelvis R/R. Local listening to the L lower deep anterior cervical fascia, pleural dome, deep thoracic fascia with myofascial tightness noted in these structures, semispinalis thoracis/cervicalis, upper thoracic multifidi L. Tight R>L suboccipitally with R sided spinal dural tightness> Tight in the clavipectoral fascia, pectorals bilaterally. Limited upper cervical retraction.   Observation Moves spontaneously through the cervical spine, thoracic spine. No guarding with UE use   Posture Forward head, forward and rounded shoulders with upper thoracic/lower cervical kyphosis with flattening below.   Planned Therapy Interventions   Planned Therapy Interventions joint mobilization;manual therapy;neuromuscular re-education;ROM;strengthening;stretching   Clinical Impression   Criteria for Skilled Therapeutic Interventions Met yes, treatment indicated   PT Diagnosis Impaired mobility related to cervicalgia, postural dysfunction   Influenced by the following impairments Postural dysfunction, limited flexibility, facet restrictions, myofascial restrictions   Functional limitations due to impairments Work tasks such as pricing, displaying items. Past issues with turning head for driving, headaches.   Clinical Presentation Stable/Uncomplicated   Clinical Decision Making (Complexity) Low complexity   Therapy Frequency   (4-6 visits to establish home program. Increase frequency to 1-2 times per week if pain returns.)   Predicted Duration of Therapy Intervention (days/wks) 3 months   Risk & Benefits of therapy have been explained Yes   Patient, Family & other staff in agreement with plan of care Yes   Clinical Impression Comments Pt is referred to PT due to cervicalgia. Since the time of her referral to present, pain has resolved. She does present with cervical and thoracic myofascial and facet restrictions and postural dysfunction with  concern with upper thoracic/lower cervical kyphosis. Short duration of PT is indicated at this time to establish a home program of stretching/postural exercises with the option of increased frequency/duration should her pain return.   Education Assessment   Preferred Learning Style Listening   Barriers to Learning No barriers   ORTHO GOALS   PT Ortho Eval Goals 1;2   Ortho Goal 1   Goal Identifier home program   Goal Description Pt to display independence and 75% compliance with a home exercise program to assist in improving posture and flexibility and prevent recurrence of pain.   Target Date 03/05/23   Ortho Goal 2   Goal Identifier Posture   Goal Description Pt to display improved myofascial and joint mobility to allow her to assume more neutral posturing with work tasks to assist in prevention of further thoracic kyphosis.   Target Date 03/05/23   Total Evaluation Time   PT Eval, Low Complexity Minutes (84738) 25   Geri Paulino PT on 12/8/2022 at 2:13 PM

## 2022-12-20 ENCOUNTER — HOSPITAL ENCOUNTER (OUTPATIENT)
Dept: PHYSICAL THERAPY | Facility: OTHER | Age: 52
Setting detail: THERAPIES SERIES
Discharge: HOME | End: 2022-12-20
Attending: CHIROPRACTOR
Payer: COMMERCIAL

## 2022-12-20 PROCEDURE — 97112 NEUROMUSCULAR REEDUCATION: CPT | Mod: GP

## 2022-12-20 PROCEDURE — 97110 THERAPEUTIC EXERCISES: CPT | Mod: GP

## 2023-02-06 NOTE — PROGRESS NOTES
[facility]    Outpatient Physical Therapy Discharge Note    Patient: Aruna Johnson  : 1970    Beginning/End Dates of Reporting Period:  22 to 2023     Referring Provider: Tegan Koch DO/Meet Celis DC    Therapy Diagnosis: Impaired mobility related to cervicalgia, postural dysfunction     Client Self Report: Pt was last seen in PT on 22.  She was seen for 2 visits.She then cancelled her appointment on 23 as she was ill. She has not scheduled further visits since that time. Subjective report on 22 as follows:     Notes she was a little sore after her last  visit. Has more soreness in the middle of her back and the knot on the right lower back. Stretches went ok. Has been able to get to them every other day.    Objective Measurements: Current status unknown due to unplanned discharge. Clinical findings on 22 as follows:   Objective Measure: postural loading  Details: General listening to the mid thoracic spine with extended listening to the R SI. Postural loading head L/L, shoulders L/L proximal and R/R, L/R distally. Pelvis R/R.  Objective Measure: myofascial     Objective Measure: segmental mobility  Details: FRS R T23, T34, T45, L5S1. Standing flexion, seated flexion and stork R. R pube/ASIS inferior. R DEION posterior in extension. Decreased thoracic sideglides T3,4,5 and otherwise stiff in the thoracic spine.         Home program: supine QL stretch, supine Vincentian with UE/LE, anterior cervical fascia self stretch, clavipectoral stretch doorframe. SL thoracic rotation at, < and > 90. Supine piriformis<90, supine pubic self correction.         Goals: Progress toward goals unknown due to unplanned discharge/  Goal Identifier home program   Goal Description Pt to display independence and 75% compliance with a home exercise program to assist in improving posture and  flexibility and prevent recurrence of pain.   Target Date 03/05/23   Date Met      Progress (detail required for progress note):       Goal Identifier Posture   Goal Description Pt to display improved myofascial and joint mobility to allow her to assume more neutral posturing with work tasks to assist in prevention of further thoracic kyphosis.   Target Date 03/05/23   Date Met      Progress (detail required for progress note):             Plan:  Discharge from therapy.    Discharge:    Reason for Discharge: Patient has failed to schedule further appointments.      Discharge Plan: Patient to continue home program.    Geri Paulino, PT on 2/6/2023 at 9:29 AM

## 2023-08-17 NOTE — PROGRESS NOTES
SUBJECTIVE:   CC: Aruna is an 53 year old who presents for preventive health visit.       8/22/2023     8:11 AM   Additional Questions   Roomed by PAULA Kincaid   Accompanied by self       Healthy Habits:     Getting at least 3 servings of Calcium per day:  Yes    Bi-annual eye exam:  NO    Dental care twice a year:  NO    Sleep apnea or symptoms of sleep apnea:  Excessive snoring    Diet:  Low fat/cholesterol    Frequency of exercise:  2-3 days/week    Duration of exercise:  15-30 minutes    Taking medications regularly:  Yes    Medication side effects:  None    Additional concerns today:  No    Have you ever done Advance Care Planning? (For example, a Health Directive, POLST, or a discussion with a medical provider or your loved ones about your wishes): None    Social History     Tobacco Use    Smoking status: Every Day     Types: Cigarettes    Smokeless tobacco: Never    Tobacco comments:     5 cigarettes, ready cut back   Substance Use Topics    Alcohol use: Yes     Comment: Weekends           8/22/2023     8:04 AM   Alcohol Use   Prescreen: >3 drinks/day or >7 drinks/week? No     Reviewed orders with patient.  Reviewed health maintenance and updated orders accordingly - Yes  BP Readings from Last 3 Encounters:   08/22/23 130/78   10/05/22 110/72   08/19/22 108/70    Wt Readings from Last 3 Encounters:   08/22/23 61.2 kg (135 lb)   08/19/22 65.5 kg (144 lb 8 oz)   07/12/22 65.4 kg (144 lb 3.2 oz)                  Patient Active Problem List   Diagnosis    Lumbar back pain with radiculopathy affecting right lower extremity    Elevated LDL cholesterol level    MARY (generalized anxiety disorder)     Past Surgical History:   Procedure Laterality Date    EXTRACTION(S) DENTAL  ~2000    + nausea from anesthesia       Social History     Tobacco Use    Smoking status: Every Day     Types: Cigarettes    Smokeless tobacco: Never    Tobacco comments:     5 cigarettes, ready cut back   Substance Use Topics    Alcohol use:  Yes     Comment: Weekends     Family History   Problem Relation Age of Onset    Breast Cancer Other     Cancer Mother         sarcoma - outside of stomach    No Known Problems Father     Breast Cancer Maternal Grandmother          Current Outpatient Medications   Medication Sig Dispense Refill    Multiple Vitamins-Minerals (MULTIVITAMIN & MINERAL PO) Take 1 tablet by mouth daily      PARoxetine (PAXIL) 40 MG tablet Take 1 tablet (40 mg) by mouth daily 90 tablet 4    triamcinolone (KENALOG) 0.1 % external cream Apply topically 2 times daily as needed for irritation 30 g 0     No Known Allergies    Breast Cancer Screening:  Any new diagnosis of family breast, ovarian, or bowel cancer? No    FHS-7:       9/20/2022     4:07 PM   Breast CA Risk Assessment (FHS-7)   Did any of your first-degree relatives have breast or ovarian cancer? No   Did any of your relatives have bilateral breast cancer? No   Did any man in your family have breast cancer? No   Did any woman in your family have breast and ovarian cancer? No   Did any woman in your family have breast cancer before age 50 y? No   Do you have 2 or more relatives with breast and/or ovarian cancer? No   Do you have 2 or more relatives with breast and/or bowel cancer? No     Mammogram Screening: Recommended annual mammography  Pertinent mammograms are reviewed under the imaging tab.      No LMP recorded. Patient is perimenopausal.   Contraception: NA; hasn't used anything x 25+ years.  Risk for STI?: No  Pap: 8/19/22, ASCUS with neg HPV.  Due 8/19/2025.    Any hx of abnormal paps:    2020: Neg/+ HPV.  8/5/2021, neg co testing.   8/19/22: ASCUS, neg HPV    FH of early CA?: + breast CA.  Cholesterol/DM concerns/screening: Due  Tobacco?: yes, not yet candidate for LDCT screening  Calcium intake: No  DEXA: NA  Last mammo: 9/20/22, birads1.  Due soon.  Colon: Cologuard negative 9/5/2022.  Due 9/5/2025.  Immunizations: Tdap 4/17/2015; recommend flu yearly; Shingrix DUE - to  "receive at the pharmacy; PNA @ 65/66.  PPSV23 2021 (smoking history).  Remains candidate for Covid-19 series.      + snoring, apnea heard by her daughter on a trip.   doesn't share a room with her anymore.  More tired throughout the day.  STOP-BAN      Anxiety.  Had a grandson diagnosed with Neuroblastoma.  On Paxil.  Would like to continue on current dosing.      Reviewed and updated as needed this visit by clinical staff   Tobacco  Allergies  Meds  Problems  Med Hx  Surg Hx  Fam Hx      Reviewed and updated as needed this visit by Provider   Tobacco  Allergies  Meds  Problems  Med Hx  Surg Hx  Fam Hx         History reviewed. No pertinent past medical history.   Past Surgical History:   Procedure Laterality Date    EXTRACTION(S) DENTAL  ~    + nausea from anesthesia     OB History    Para Term  AB Living   2 2 2 0 0 2   SAB IAB Ectopic Multiple Live Births   0 0 0 0 0      # Outcome Date GA Lbr Chapin/2nd Weight Sex Delivery Anes PTL Lv   2 Term            1 Term                Review of Systems   Gastrointestinal:  Positive for constipation.   Breasts:  Negative for tenderness, breast mass and discharge.   Genitourinary:  Negative for pelvic pain, vaginal bleeding and vaginal discharge.      OBJECTIVE:   /78   Pulse 73   Temp (!) 96.5  F (35.8  C) (Tympanic)   Resp 16   Ht 1.537 m (5' 0.5\")   Wt 61.2 kg (135 lb)   LMP 10/01/2021 (Approximate)   SpO2 97%   BMI 25.93 kg/m    Physical Exam  GENERAL: healthy, alert and no distress  EYES: Eyes grossly normal to inspection, PERRL and conjunctivae and sclerae normal  HENT: ear canals and TM's normal, nose and mouth without ulcers or lesions  NECK: no adenopathy, no asymmetry, masses, or scars and thyroid normal to palpation  RESP: lungs clear to auscultation - no rales, rhonchi or wheezes  BREAST: deferred for mammogram.  CV: regular rate and rhythm, normal S1 S2, no S3 or S4, no murmur, click or rub, no " peripheral edema and peripheral pulses strong  ABDOMEN: soft, nontender, no hepatosplenomegaly, no masses and bowel sounds normal  MS: no gross musculoskeletal defects noted, no edema  SKIN: no suspicious lesions or rashes  NEURO: Normal strength and tone, mentation intact and speech normal  PSYCH: mentation appears normal, affect normal/bright    Diagnostic Test Results:  Results for orders placed or performed in visit on 08/22/23 (from the past 24 hour(s))   CBC and Differential    Narrative    The following orders were created for panel order CBC and Differential.  Procedure                               Abnormality         Status                     ---------                               -----------         ------                     CBC with platelets and d...[[id number]]  Abnormal            Final result                 Please view results for these tests on the individual orders.   CBC with platelets and differential   Result Value Ref Range    WBC Count 3.6 (L) 4.0 - 11.0 10e3/uL    RBC Count 4.53 3.80 - 5.20 10e6/uL    Hemoglobin 14.0 11.7 - 15.7 g/dL    Hematocrit 40.5 35.0 - 47.0 %    MCV 89 78 - 100 fL    MCH 30.9 26.5 - 33.0 pg    MCHC 34.6 31.5 - 36.5 g/dL    RDW 12.6 10.0 - 15.0 %    Platelet Count 206 150 - 450 10e3/uL    % Neutrophils 47 %    % Lymphocytes 40 %    % Monocytes 10 %    % Eosinophils 2 %    % Basophils 1 %    % Immature Granulocytes 0 %    NRBCs per 100 WBC 0 <1 /100    Absolute Neutrophils 1.7 1.6 - 8.3 10e3/uL    Absolute Lymphocytes 1.4 0.8 - 5.3 10e3/uL    Absolute Monocytes 0.4 0.0 - 1.3 10e3/uL    Absolute Eosinophils 0.1 0.0 - 0.7 10e3/uL    Absolute Basophils 0.0 0.0 - 0.2 10e3/uL    Absolute Immature Granulocytes 0.0 <=0.4 10e3/uL    Absolute NRBCs 0.0 10e3/uL       ASSESSMENT/PLAN:   1. Routine history and physical examination of adult  - MA Screen Bilateral w/Raman; Future  - Lipid Panel; Future  - CBC and Differential; Future  - Comprehensive Metabolic Panel; Future  - TSH;  Future  - T4, Free; Future  - Ferritin; Future  - Iron & Iron Binding Capacity; Future  - Lipid Panel  - CBC and Differential  - Comprehensive Metabolic Panel  - TSH  - T4, Free  - Ferritin  - Iron & Iron Binding Capacity    2. MARY (generalized anxiety disorder)  Chronic, stable on current dose of Paxil.  Continue at same dose for now; refilled x 1 year.  - PARoxetine (PAXIL) 40 MG tablet; Take 1 tablet (40 mg) by mouth daily  Dispense: 90 tablet; Refill: 4  - CBC and Differential; Future  - Comprehensive Metabolic Panel; Future  - TSH; Future  - T4, Free; Future  - CBC and Differential  - Comprehensive Metabolic Panel  - TSH  - T4, Free    3. Risk factors for obstructive sleep apnea  4. Fatigue, unspecified type  5. Snoring  6. Witnessed episode of apnea  Chronic, with continued fatigue.  Relating most of her fatigue to menopause, stress, etc.  But is at moderate risk for BLADIMIR (STOP-BAN)  Will proceed with sleep testing to determine if she is a candidate for CPAP therapy.  Monitoring labs of iron levels today.  - Adult Sleep Eval & Management  Referral; Future  - Ferritin; Future  - Iron & Iron Binding Capacity; Future  - Ferritin  - Iron & Iron Binding Capacity    7. Visit for screening mammogram  - MA Screen Bilateral w/Raman; Future    8. Lipid screening  - Lipid Panel; Future  - Lipid Panel        The 10-year ASCVD risk score (Jacky ALEGRE, et al., 2019) is: 4.5%    Values used to calculate the score:      Age: 53 years      Sex: Female      Is Non- : No      Diabetic: No      Tobacco smoker: Yes      Systolic Blood Pressure: 130 mmHg      Is BP treated: No      HDL Cholesterol: 57 mg/dL      Total Cholesterol: 214 mg/dL    Consider rosuvastatin if still high/higher.    Patient has been advised of split billing requirements and indicates understanding: Yes    COUNSELING:  Reviewed preventive health counseling, as reflected in patient instructions    BMI:   Estimated body mass  "index is 25.93 kg/m  as calculated from the following:    Height as of this encounter: 1.537 m (5' 0.5\").    Weight as of this encounter: 61.2 kg (135 lb).   Weight management plan: Discussed healthy diet and exercise guidelines      She reports that she has been smoking cigarettes. She has never used smokeless tobacco.  Nicotine/Tobacco Cessation Plan:   Information offered: Patient not interested at this time          Tegan Koch,   [facility] AND HOSPITALAnswers submitted by the patient for this visit:  Patient Health Questionnaire (Submitted on 8/22/2023)  If you checked off any problems, how difficult have these problems made it for you to do your work, take care of things at home, or get along with other people?: Not difficult at all  PHQ9 TOTAL SCORE: 3    "

## 2023-08-22 ENCOUNTER — OFFICE VISIT (OUTPATIENT)
Dept: FAMILY MEDICINE | Facility: OTHER | Age: 53
End: 2023-08-22
Attending: FAMILY MEDICINE
Payer: COMMERCIAL

## 2023-08-22 VITALS
OXYGEN SATURATION: 97 % | WEIGHT: 135 LBS | HEIGHT: 61 IN | SYSTOLIC BLOOD PRESSURE: 130 MMHG | BODY MASS INDEX: 25.49 KG/M2 | TEMPERATURE: 96.5 F | DIASTOLIC BLOOD PRESSURE: 78 MMHG | HEART RATE: 73 BPM | RESPIRATION RATE: 16 BRPM

## 2023-08-22 DIAGNOSIS — Z00.00 ROUTINE HISTORY AND PHYSICAL EXAMINATION OF ADULT: Primary | ICD-10-CM

## 2023-08-22 DIAGNOSIS — R06.81 WITNESSED EPISODE OF APNEA: ICD-10-CM

## 2023-08-22 DIAGNOSIS — E78.00 ELEVATED LDL CHOLESTEROL LEVEL: ICD-10-CM

## 2023-08-22 DIAGNOSIS — Z91.89 RISK FACTORS FOR OBSTRUCTIVE SLEEP APNEA: ICD-10-CM

## 2023-08-22 DIAGNOSIS — R06.83 SNORING: ICD-10-CM

## 2023-08-22 DIAGNOSIS — Z12.31 VISIT FOR SCREENING MAMMOGRAM: ICD-10-CM

## 2023-08-22 DIAGNOSIS — R74.8 ELEVATED ALKALINE PHOSPHATASE LEVEL: ICD-10-CM

## 2023-08-22 DIAGNOSIS — Z13.220 LIPID SCREENING: ICD-10-CM

## 2023-08-22 DIAGNOSIS — F41.1 GAD (GENERALIZED ANXIETY DISORDER): ICD-10-CM

## 2023-08-22 DIAGNOSIS — R53.83 FATIGUE, UNSPECIFIED TYPE: ICD-10-CM

## 2023-08-22 LAB
ALBUMIN SERPL BCG-MCNC: 4.4 G/DL (ref 3.5–5.2)
ALP SERPL-CCNC: 116 U/L (ref 35–104)
ALT SERPL W P-5'-P-CCNC: 18 U/L (ref 0–50)
ANION GAP SERPL CALCULATED.3IONS-SCNC: 10 MMOL/L (ref 7–15)
AST SERPL W P-5'-P-CCNC: 21 U/L (ref 0–45)
BASOPHILS # BLD AUTO: 0 10E3/UL (ref 0–0.2)
BASOPHILS NFR BLD AUTO: 1 %
BILIRUB SERPL-MCNC: 0.2 MG/DL
BUN SERPL-MCNC: 12.6 MG/DL (ref 6–20)
CALCIUM SERPL-MCNC: 9.8 MG/DL (ref 8.6–10)
CHLORIDE SERPL-SCNC: 106 MMOL/L (ref 98–107)
CHOLEST SERPL-MCNC: 253 MG/DL
CREAT SERPL-MCNC: 0.86 MG/DL (ref 0.51–0.95)
DEPRECATED HCO3 PLAS-SCNC: 23 MMOL/L (ref 22–29)
EOSINOPHIL # BLD AUTO: 0.1 10E3/UL (ref 0–0.7)
EOSINOPHIL NFR BLD AUTO: 2 %
ERYTHROCYTE [DISTWIDTH] IN BLOOD BY AUTOMATED COUNT: 12.6 % (ref 10–15)
FERRITIN SERPL-MCNC: 50 NG/ML (ref 11–328)
GFR SERPL CREATININE-BSD FRML MDRD: 80 ML/MIN/1.73M2
GLUCOSE SERPL-MCNC: 91 MG/DL (ref 70–99)
HCT VFR BLD AUTO: 40.5 % (ref 35–47)
HDLC SERPL-MCNC: 48 MG/DL
HGB BLD-MCNC: 14 G/DL (ref 11.7–15.7)
IMM GRANULOCYTES # BLD: 0 10E3/UL
IMM GRANULOCYTES NFR BLD: 0 %
IRON BINDING CAPACITY (ROCHE): 327 UG/DL (ref 240–430)
IRON SATN MFR SERPL: 28 % (ref 15–46)
IRON SERPL-MCNC: 92 UG/DL (ref 37–145)
LDLC SERPL CALC-MCNC: 187 MG/DL
LYMPHOCYTES # BLD AUTO: 1.4 10E3/UL (ref 0.8–5.3)
LYMPHOCYTES NFR BLD AUTO: 40 %
MCH RBC QN AUTO: 30.9 PG (ref 26.5–33)
MCHC RBC AUTO-ENTMCNC: 34.6 G/DL (ref 31.5–36.5)
MCV RBC AUTO: 89 FL (ref 78–100)
MONOCYTES # BLD AUTO: 0.4 10E3/UL (ref 0–1.3)
MONOCYTES NFR BLD AUTO: 10 %
NEUTROPHILS # BLD AUTO: 1.7 10E3/UL (ref 1.6–8.3)
NEUTROPHILS NFR BLD AUTO: 47 %
NONHDLC SERPL-MCNC: 205 MG/DL
NRBC # BLD AUTO: 0 10E3/UL
NRBC BLD AUTO-RTO: 0 /100
PLATELET # BLD AUTO: 206 10E3/UL (ref 150–450)
POTASSIUM SERPL-SCNC: 4.2 MMOL/L (ref 3.4–5.3)
PROT SERPL-MCNC: 7.1 G/DL (ref 6.4–8.3)
RBC # BLD AUTO: 4.53 10E6/UL (ref 3.8–5.2)
SODIUM SERPL-SCNC: 139 MMOL/L (ref 136–145)
T4 FREE SERPL-MCNC: 1.1 NG/DL (ref 0.9–1.7)
TRIGL SERPL-MCNC: 90 MG/DL
TSH SERPL DL<=0.005 MIU/L-ACNC: 1.96 UIU/ML (ref 0.3–4.2)
WBC # BLD AUTO: 3.6 10E3/UL (ref 4–11)

## 2023-08-22 PROCEDURE — 80053 COMPREHEN METABOLIC PANEL: CPT | Mod: ZL | Performed by: FAMILY MEDICINE

## 2023-08-22 PROCEDURE — 80061 LIPID PANEL: CPT | Mod: ZL | Performed by: FAMILY MEDICINE

## 2023-08-22 PROCEDURE — 83550 IRON BINDING TEST: CPT | Mod: ZL | Performed by: FAMILY MEDICINE

## 2023-08-22 PROCEDURE — 36415 COLL VENOUS BLD VENIPUNCTURE: CPT | Mod: ZL | Performed by: FAMILY MEDICINE

## 2023-08-22 PROCEDURE — 84439 ASSAY OF FREE THYROXINE: CPT | Mod: ZL | Performed by: FAMILY MEDICINE

## 2023-08-22 PROCEDURE — 82728 ASSAY OF FERRITIN: CPT | Mod: ZL | Performed by: FAMILY MEDICINE

## 2023-08-22 PROCEDURE — 84443 ASSAY THYROID STIM HORMONE: CPT | Mod: ZL | Performed by: FAMILY MEDICINE

## 2023-08-22 PROCEDURE — 99396 PREV VISIT EST AGE 40-64: CPT | Performed by: FAMILY MEDICINE

## 2023-08-22 PROCEDURE — 85025 COMPLETE CBC W/AUTO DIFF WBC: CPT | Mod: ZL | Performed by: FAMILY MEDICINE

## 2023-08-22 RX ORDER — PAROXETINE 40 MG/1
40 TABLET, FILM COATED ORAL DAILY
Qty: 90 TABLET | Refills: 4 | Status: SHIPPED | OUTPATIENT
Start: 2023-08-22

## 2023-08-22 RX ORDER — ROSUVASTATIN CALCIUM 5 MG/1
5 TABLET, COATED ORAL DAILY
Qty: 90 TABLET | Refills: 1 | Status: SHIPPED | OUTPATIENT
Start: 2023-08-22

## 2023-08-22 ASSESSMENT — ENCOUNTER SYMPTOMS
CONSTIPATION: 1
BREAST MASS: 0

## 2023-08-22 ASSESSMENT — PATIENT HEALTH QUESTIONNAIRE - PHQ9
SUM OF ALL RESPONSES TO PHQ QUESTIONS 1-9: 3
10. IF YOU CHECKED OFF ANY PROBLEMS, HOW DIFFICULT HAVE THESE PROBLEMS MADE IT FOR YOU TO DO YOUR WORK, TAKE CARE OF THINGS AT HOME, OR GET ALONG WITH OTHER PEOPLE: NOT DIFFICULT AT ALL
SUM OF ALL RESPONSES TO PHQ QUESTIONS 1-9: 3

## 2023-08-22 ASSESSMENT — PAIN SCALES - GENERAL: PAINLEVEL: NO PAIN (0)

## 2023-08-22 NOTE — NURSING NOTE
"Chief Complaint   Patient presents with    Physical       Initial /78   Pulse 73   Temp (!) 96.5  F (35.8  C) (Tympanic)   Resp 16   Ht 1.537 m (5' 0.5\")   Wt 61.2 kg (135 lb)   LMP 10/01/2021 (Approximate)   SpO2 97%   BMI 25.93 kg/m   Estimated body mass index is 25.93 kg/m  as calculated from the following:    Height as of this encounter: 1.537 m (5' 0.5\").    Weight as of this encounter: 61.2 kg (135 lb).  Medication Reconciliation: complete    Codi Nicolas LPN    Advanced Care Directive reviewed.       "

## 2023-08-22 NOTE — PROGRESS NOTES
ADDENDUM:  The 10-year ASCVD risk score (Jacky ALEGRE, et al., 2019) is: 6.6%    Values used to calculate the score:      Age: 53 years      Sex: Female      Is Non- : No      Diabetic: No      Tobacco smoker: Yes      Systolic Blood Pressure: 130 mmHg      Is BP treated: No      HDL Cholesterol: 48 mg/dL      Total Cholesterol: 253 mg/dL    Rx for rosuvastatin 5mg daily (and can reduce to 2.5mg daily if still myalgia/myositis side effects).  Lab orders placed for 3-4 months.    Alk phos also returned slightly elevated, would recheck at that time and with next physical.    Tegan Koch,  on 8/22/2023 at 4:45 PM

## 2023-09-26 ENCOUNTER — HOSPITAL ENCOUNTER (OUTPATIENT)
Dept: MAMMOGRAPHY | Facility: OTHER | Age: 53
Discharge: HOME | End: 2023-09-26
Attending: FAMILY MEDICINE | Admitting: FAMILY MEDICINE
Payer: COMMERCIAL

## 2023-09-26 DIAGNOSIS — Z12.31 VISIT FOR SCREENING MAMMOGRAM: ICD-10-CM

## 2023-09-26 DIAGNOSIS — Z00.00 ROUTINE HISTORY AND PHYSICAL EXAMINATION OF ADULT: ICD-10-CM

## 2023-09-26 PROCEDURE — 77067 SCR MAMMO BI INCL CAD: CPT

## 2023-11-21 ENCOUNTER — MYC REFILL (OUTPATIENT)
Dept: FAMILY MEDICINE | Facility: OTHER | Age: 53
End: 2023-11-21
Payer: COMMERCIAL

## 2023-11-21 DIAGNOSIS — F41.1 GAD (GENERALIZED ANXIETY DISORDER): ICD-10-CM

## 2023-11-22 RX ORDER — PAROXETINE 40 MG/1
40 TABLET, FILM COATED ORAL DAILY
Qty: 90 TABLET | Refills: 4 | OUTPATIENT
Start: 2023-11-22

## 2023-11-22 NOTE — TELEPHONE ENCOUNTER
Patient sent Rx request for the following:      Requested Prescriptions   Pending Prescriptions Disp Refills    PARoxetine (PAXIL) 40 MG tablet 90 tablet 4     Sig: Take 1 tablet (40 mg) by mouth daily       Last Prescription Date:   8/22/23  Last Fill Qty/Refills:         90, R-4    Last Office Visit:              8/22/23   Future Office visit:           none    Should have refills on file. Medication request denied.  Cary Rodriguez RN on 11/22/2023 at 3:19 PM

## 2024-01-02 ENCOUNTER — MYC MEDICAL ADVICE (OUTPATIENT)
Dept: FAMILY MEDICINE | Facility: OTHER | Age: 54
End: 2024-01-02
Payer: COMMERCIAL

## 2024-08-23 ENCOUNTER — TRANSFERRED RECORDS (OUTPATIENT)
Dept: MULTI SPECIALTY CLINIC | Facility: CLINIC | Age: 54
End: 2024-08-23

## 2024-08-23 LAB
CHOLESTEROL (EXTERNAL): 238 MG/DL (ref 0–200)
HDLC SERPL-MCNC: 58 MG/DL
LDL CHOLESTEROL CALCULATED (EXTERNAL): 161 MG/DL (ref 0–100)
NON HDL CHOLESTEROL (EXTERNAL): 180 MG/DL (ref 0–130)
TRIGLYCERIDES (EXTERNAL): 93 MG/DL

## 2025-02-19 ENCOUNTER — OFFICE VISIT (OUTPATIENT)
Dept: FAMILY MEDICINE | Facility: OTHER | Age: 55
End: 2025-02-19
Attending: NURSE PRACTITIONER
Payer: COMMERCIAL

## 2025-02-19 VITALS
WEIGHT: 138.4 LBS | BODY MASS INDEX: 26.58 KG/M2 | OXYGEN SATURATION: 97 % | TEMPERATURE: 99.6 F | DIASTOLIC BLOOD PRESSURE: 87 MMHG | RESPIRATION RATE: 18 BRPM | SYSTOLIC BLOOD PRESSURE: 132 MMHG | HEART RATE: 100 BPM

## 2025-02-19 DIAGNOSIS — R50.9 FEVER AND CHILLS: ICD-10-CM

## 2025-02-19 DIAGNOSIS — J02.9 VIRAL PHARYNGITIS: Primary | ICD-10-CM

## 2025-02-19 DIAGNOSIS — J02.9 SORE THROAT: ICD-10-CM

## 2025-02-19 LAB — S PYO DNA THROAT QL NAA+PROBE: NOT DETECTED

## 2025-02-19 PROCEDURE — 87651 STREP A DNA AMP PROBE: CPT | Mod: ZL | Performed by: NURSE PRACTITIONER

## 2025-02-19 ASSESSMENT — PAIN SCALES - GENERAL: PAINLEVEL_OUTOF10: NO PAIN (0)

## 2025-02-19 NOTE — NURSING NOTE
"Chief Complaint   Patient presents with    Pharyngitis     Sunday    Fever    Fatigue   Patient presents to [facility] clinic with concerns of possible strep throat. Patient reports feeling ill on Sunday night with chills, body aches, fatigue, fever, and sore throat. Patients grandson also had strep throat roughly 2 weeks ago.     Initial /87 (BP Location: Left arm, Patient Position: Sitting, Cuff Size: Adult Regular)   Pulse 100   Temp 99.6  F (37.6  C) (Temporal)   Resp 18   Wt 62.8 kg (138 lb 6.4 oz)   LMP 10/01/2021 (Approximate)   SpO2 97%   BMI 26.58 kg/m   Estimated body mass index is 26.58 kg/m  as calculated from the following:    Height as of 8/22/23: 1.537 m (5' 0.5\").    Weight as of this encounter: 62.8 kg (138 lb 6.4 oz).  Medication Review: complete    The next two questions are to help us understand your food security.  If you are feeling you need any assistance in this area, we have resources available to support you today.           No data to display                  Health Care Directive:  Patient does not have a Health Care Directive: Discussed advance care planning with patient; however, patient declined at this time.    Elena Muñoz      "

## 2025-02-19 NOTE — LETTER
February 19, 2025      Aruna Johnson  [id number]  [place] [address]        To Whom It May Concern:    Aruna Johnson  was seen on 2/19/25.  Please excuse her from work 2/17/25 to 2/20/25 due to fever/illness.        Sincerely,        Madeleine Ruggiero NP    Electronically signed

## 2025-02-19 NOTE — PROGRESS NOTES
ASSESSMENT/PLAN:     I have reviewed the nursing notes.  I have reviewed the findings, diagnosis, plan and need for follow up with the patient.        1. Fever and chills  - Group A Streptococcus PCR Throat Swab    2. Sore throat  - Group A Streptococcus PCR Throat Swab    3. Viral pharyngitis (Primary)  Negative Strep PCR test  Discussed with patient that symptoms and exam are consistent with viral illness.    No clinical indications for antibiotics    Symptomatic treatment - Encouraged fluids, salt water gargles, honey, elevation, lozenges, tea, soup, smoothies, popsicles,etc   May use over-the-counter Tylenol or ibuprofen PRN    Discussed warning signs/symptoms indicative of need to f/u  Follow up if symptoms persist or worsen or concerns      I explained my diagnostic considerations and recommendations to the patient, who voiced understanding and agreement with the treatment plan. All questions were answered. We discussed potential side effects of any prescribed or recommended therapies, as well as expectations for response to treatments.    Madeleine Ruggiero NP  [facility]      SUBJECTIVE:   Aruna Johnson is a 54 year old female who presents to clinic today for the following health issues:  Fever, sore throat    HPI  Sick the past 3 days with fever, chills, sore throat, decreased appetite and fatigue.  Highest fever of 101.  No cough or shortness of breath.  No nasal drainage/congestion.    Mild nausea.  No vomiting.  Only solid intake was last night dinner.  Drinking extra fluids.  Taking Ibuprofen          No past medical history on file.  Past Surgical History:   Procedure Laterality Date    EXTRACTION(S) DENTAL  ~2000    + nausea from anesthesia     Social History     Tobacco Use    Smoking status: Every Day     Types: Cigarettes    Smokeless tobacco: Never    Tobacco comments:     5 cigarettes, ready cut back   Substance Use Topics    Alcohol use: Yes     Comment: Weekends     Current  Outpatient Medications   Medication Sig Dispense Refill    Multiple Vitamins-Minerals (MULTIVITAMIN & MINERAL PO) Take 1 tablet by mouth daily      PARoxetine (PAXIL) 40 MG tablet Take 1 tablet (40 mg) by mouth daily 90 tablet 4    triamcinolone (KENALOG) 0.1 % external cream Apply topically 2 times daily as needed for irritation 30 g 0    rosuvastatin (CRESTOR) 5 MG tablet Take 1 tablet (5 mg) by mouth daily (Patient not taking: Reported on 2/19/2025) 90 tablet 1     No Known Allergies      Past medical history, past surgical history, current medications and allergies reviewed and accurate to the best of my knowledge.        OBJECTIVE:     /87 (BP Location: Left arm, Patient Position: Sitting, Cuff Size: Adult Regular)   Pulse 100   Temp 99.6  F (37.6  C) (Temporal)   Resp 18   Wt 62.8 kg (138 lb 6.4 oz)   LMP 10/01/2021 (Approximate)   SpO2 97%   BMI 26.58 kg/m    Body mass index is 26.58 kg/m .        Physical Exam  General Appearance: Well appearing adult female, appropriate appearance for age. No acute distress  Ears: Left TM intact, no erythema, no effusion, no bulging, no purulence.  Right TM intact, no erythema, no effusion, no bulging, no purulence.  Left auditory canal clear without drainage or bleeding.  Right auditory canal clear without drainage or bleeding.  Normal external ears, non tender.  Orophayrnx: moist mucous membranes, pharynx with erythema, tonsils without hypertrophy, tonsils without erythema, no tonsillar exudates, no oral lesions, no palate petechiae, no post nasal drip seen, no trismus, voice clear.    Nose:  No noted drainage or congestion   Neck:  minimal tonsillar lymph node enlargement, tender to palpation   Respiratory: normal chest wall and respirations.  Normal effort.  Clear to auscultation bilaterally, no wheezing, crackles or rhonchi.  No increased work of breathing.  No cough appreciated.  Cardiac: RRR with no murmurs  Musculoskeletal:  Equal movement of bilateral  upper extremities.  Equal movement of bilateral lower extremities.  Normal gait.    Psychological: normal affect, alert, oriented, and pleasant.       Labs:  Results for orders placed or performed in visit on 02/19/25   Group A Streptococcus PCR Throat Swab     Status: Normal    Specimen: Throat; Swab   Result Value Ref Range    Group A strep by PCR Not Detected Not Detected    Narrative    The Xpert Xpress Strep A test, performed on the [employer], is a rapid, qualitative in vitro diagnostic test for the detection of Streptococcus pyogenes (Group A ß-hemolytic Streptococcus, Strep A) in throat swab specimens from patients with signs and symptoms of pharyngitis. The Xpert Xpress Strep A test can be used as an aid in the diagnosis of Group A Streptococcal pharyngitis. The assay is not intended to monitor treatment for Group A Streptococcus infections. The Xpert Xpress Strep A test utilizes an automated real-time polymerase chain reaction (PCR) to detect Streptococcus pyogenes DNA.

## 2025-02-25 ENCOUNTER — OFFICE VISIT (OUTPATIENT)
Dept: FAMILY MEDICINE | Facility: OTHER | Age: 55
End: 2025-02-25
Attending: STUDENT IN AN ORGANIZED HEALTH CARE EDUCATION/TRAINING PROGRAM
Payer: COMMERCIAL

## 2025-02-25 VITALS
TEMPERATURE: 98.2 F | HEART RATE: 91 BPM | OXYGEN SATURATION: 97 % | HEIGHT: 60 IN | WEIGHT: 142 LBS | DIASTOLIC BLOOD PRESSURE: 70 MMHG | BODY MASS INDEX: 27.88 KG/M2 | RESPIRATION RATE: 19 BRPM | SYSTOLIC BLOOD PRESSURE: 130 MMHG

## 2025-02-25 DIAGNOSIS — H65.91 OME (OTITIS MEDIA WITH EFFUSION), RIGHT: Primary | ICD-10-CM

## 2025-02-25 DIAGNOSIS — B37.9 ANTIBIOTIC-INDUCED YEAST INFECTION: ICD-10-CM

## 2025-02-25 DIAGNOSIS — T36.95XA ANTIBIOTIC-INDUCED YEAST INFECTION: ICD-10-CM

## 2025-02-25 RX ORDER — FLUCONAZOLE 150 MG/1
150 TABLET ORAL ONCE
Qty: 1 TABLET | Refills: 0 | Status: SHIPPED | OUTPATIENT
Start: 2025-02-25 | End: 2025-02-25

## 2025-02-25 RX ORDER — AMOXICILLIN 875 MG/1
875 TABLET, COATED ORAL 2 TIMES DAILY
Qty: 14 TABLET | Refills: 0 | Status: SHIPPED | OUTPATIENT
Start: 2025-02-25 | End: 2025-03-04

## 2025-02-25 ASSESSMENT — ENCOUNTER SYMPTOMS
ENDOCRINE NEGATIVE: 1
NEUROLOGICAL NEGATIVE: 1
PSYCHIATRIC NEGATIVE: 1
ALLERGIC/IMMUNOLOGIC NEGATIVE: 1
RESPIRATORY NEGATIVE: 1
EYES NEGATIVE: 1
CARDIOVASCULAR NEGATIVE: 1
CONSTITUTIONAL NEGATIVE: 1
GASTROINTESTINAL NEGATIVE: 1
HEMATOLOGIC/LYMPHATIC NEGATIVE: 1
MUSCULOSKELETAL NEGATIVE: 1

## 2025-02-25 ASSESSMENT — PAIN SCALES - GENERAL: PAINLEVEL_OUTOF10: MODERATE PAIN (4)

## 2025-02-25 NOTE — NURSING NOTE
Chief Complaint   Patient presents with    Otalgia     Right ear       Patient here for right ear pain that started today. She was recently for a sore throat.     Initial /70   Pulse 91   Temp 98.2  F (36.8  C) (Tympanic)   Resp 19   Ht 1.524 m (5')   Wt 64.4 kg (142 lb)   LMP 10/01/2021 (Approximate)   SpO2 97%   BMI 27.73 kg/m   Estimated body mass index is 27.73 kg/m  as calculated from the following:    Height as of this encounter: 1.524 m (5').    Weight as of this encounter: 64.4 kg (142 lb).  Medication Review: complete    The next two questions are to help us understand your food security.  If you are feeling you need any assistance in this area, we have resources available to support you today.          2/25/2025   SDOH- Food Insecurity   Within the past 12 months, did you worry that your food would run out before you got money to buy more? N   Within the past 12 months, did the food you bought just not last and you didn t have money to get more? N         Health Care Directive:  Patient does not have a Health Care Directive: Discussed advance care planning with patient; however, patient declined at this time.    Veronica Hughes LPN

## 2025-02-25 NOTE — PROGRESS NOTES
Aruna Johnson  1970    ASSESSMENT/PLAN      Presents to [facility] clinic with right ear pain that started today.  Patient did recently have a viral infection with cough, congestion in the last week.  Patient with noted otitis media on exam. Patient's vitals are stable and she appears nontoxic.        1. OME (otitis media with effusion), right (Primary)    - amoxicillin (AMOXIL) 875 MG tablet; Take 1 tablet (875 mg) by mouth 2 times daily for 7 days.  Dispense: 14 tablet; Refill: 0    2. Antibiotic-induced yeast infection    - fluconazole (DIFLUCAN) 150 MG tablet; Take 1 tablet (150 mg) by mouth once for 1 dose.  Dispense: 1 tablet; Refill: 0  - May use over-the-counter Tylenol or ibuprofen PRN  - Follow up as needed for new or worsening symptoms        *A shared decision making model was used.   *Patient and/or associated parties understood and were agreeable to treatment plan.   *Plan and all results were discussed.   *Explanation of diagnosis, treatment options and risk and benefits of medications reviewed with patient.    *Time was taken to answer all questions.   *Red flags symptoms were discussed and patient was advised when they should return for reevaluation or for prompt emergency evaluation.   *Patient was given verbal and written instructions on plan of care. Instructions were printed or are available on [employer] on electronic AVS.   *We discussed potential side effects of any prescribed or recommended therapies, as well as expectations for response to treatments.  *Patient discharged in stable condition    Chasity Sierra CNP  [facility]    SUBJECTIVE  CHIEF COMPLAINT/ REASON FOR VISIT  Patient presents with:  Otalgia: Right ear       HISTORY OF PRESENT ILLNESS  Aruna Johnson is a pleasant 54 year old female presents to [facility] clinic today with right ear pain that started today.  Patient did recently have a viral infection with cough, congestion in the last week.       I have reviewed  the nursing notes.  I have reviewed allergies, medication list, problem list, and past medical history.    REVIEW OF SYSTEMS  Review of Systems   Constitutional: Negative.    HENT:  Positive for ear pain.    Eyes: Negative.    Respiratory: Negative.     Cardiovascular: Negative.    Gastrointestinal: Negative.    Endocrine: Negative.    Genitourinary: Negative.    Musculoskeletal: Negative.    Skin: Negative.    Allergic/Immunologic: Negative.    Neurological: Negative.    Hematological: Negative.    Psychiatric/Behavioral: Negative.     All other systems reviewed and are negative.       VITAL SIGNS  Vitals:    02/25/25 1514   BP: 130/70   Pulse: 91   Resp: 19   Temp: 98.2  F (36.8  C)   TempSrc: Tympanic   SpO2: 97%   Weight: 64.4 kg (142 lb)   Height: 1.524 m (5')      Body mass index is 27.73 kg/m .      OBJECTIVE  PHYSICAL EXAM  Physical Exam  Vitals and nursing note reviewed.   Constitutional:       Appearance: Normal appearance.   HENT:      Head: Normocephalic.      Left Ear: Tympanic membrane normal.      Nose: Congestion present.      Mouth/Throat:      Mouth: Mucous membranes are moist.   Eyes:      Pupils: Pupils are equal, round, and reactive to light.   Cardiovascular:      Rate and Rhythm: Normal rate and regular rhythm.      Pulses: Normal pulses.      Heart sounds: Normal heart sounds.   Pulmonary:      Effort: Pulmonary effort is normal.      Breath sounds: Normal breath sounds.   Abdominal:      General: Bowel sounds are normal.      Palpations: Abdomen is soft.   Musculoskeletal:         General: Normal range of motion.   Skin:     General: Skin is warm and dry.      Capillary Refill: Capillary refill takes less than 2 seconds.   Neurological:      General: No focal deficit present.      Mental Status: She is alert.

## 2025-03-09 ENCOUNTER — HEALTH MAINTENANCE LETTER (OUTPATIENT)
Age: 55
End: 2025-03-09

## 2025-08-27 ENCOUNTER — PATIENT OUTREACH (OUTPATIENT)
Dept: CARE COORDINATION | Facility: CLINIC | Age: 55
End: 2025-08-27
Payer: COMMERCIAL